# Patient Record
Sex: FEMALE | Race: WHITE | Employment: FULL TIME | URBAN - METROPOLITAN AREA
[De-identification: names, ages, dates, MRNs, and addresses within clinical notes are randomized per-mention and may not be internally consistent; named-entity substitution may affect disease eponyms.]

---

## 2019-04-03 ENCOUNTER — OFFICE VISIT (OUTPATIENT)
Dept: RHEUMATOLOGY | Age: 49
End: 2019-04-03
Payer: COMMERCIAL

## 2019-04-03 VITALS
HEART RATE: 94 BPM | WEIGHT: 145 LBS | HEIGHT: 65 IN | DIASTOLIC BLOOD PRESSURE: 73 MMHG | SYSTOLIC BLOOD PRESSURE: 113 MMHG | BODY MASS INDEX: 24.16 KG/M2

## 2019-04-03 DIAGNOSIS — L93.2 CUTANEOUS LUPUS ERYTHEMATOSUS: ICD-10-CM

## 2019-04-03 DIAGNOSIS — L93.2 CUTANEOUS LUPUS ERYTHEMATOSUS: Primary | ICD-10-CM

## 2019-04-03 LAB
A/G RATIO: 1.7 (ref 1.1–2.2)
ALBUMIN SERPL-MCNC: 5.1 G/DL (ref 3.4–5)
ALP BLD-CCNC: 61 U/L (ref 40–129)
ALT SERPL-CCNC: 17 U/L (ref 10–40)
ANION GAP SERPL CALCULATED.3IONS-SCNC: 15 MMOL/L (ref 3–16)
AST SERPL-CCNC: 23 U/L (ref 15–37)
BACTERIA: ABNORMAL /HPF
BASOPHILS ABSOLUTE: 0 K/UL (ref 0–0.2)
BASOPHILS RELATIVE PERCENT: 0.7 %
BILIRUB SERPL-MCNC: 0.6 MG/DL (ref 0–1)
BILIRUBIN URINE: NEGATIVE
BLOOD, URINE: NEGATIVE
BUN BLDV-MCNC: 16 MG/DL (ref 7–20)
C-REACTIVE PROTEIN: 1.3 MG/L (ref 0–5.1)
C3 COMPLEMENT: 132 MG/DL (ref 90–180)
C4 COMPLEMENT: 22.7 MG/DL (ref 10–40)
CALCIUM SERPL-MCNC: 9.8 MG/DL (ref 8.3–10.6)
CHLORIDE BLD-SCNC: 103 MMOL/L (ref 99–110)
CLARITY: ABNORMAL
CO2: 26 MMOL/L (ref 21–32)
COLOR: YELLOW
CREAT SERPL-MCNC: 0.7 MG/DL (ref 0.6–1.1)
CREATININE URINE: 134.6 MG/DL (ref 28–259)
EOSINOPHILS ABSOLUTE: 0 K/UL (ref 0–0.6)
EOSINOPHILS RELATIVE PERCENT: 0.6 %
EPITHELIAL CELLS, UA: 1 /HPF (ref 0–5)
GFR AFRICAN AMERICAN: >60
GFR NON-AFRICAN AMERICAN: >60
GLOBULIN: 3 G/DL
GLUCOSE BLD-MCNC: 98 MG/DL (ref 70–99)
GLUCOSE URINE: NEGATIVE MG/DL
HCT VFR BLD CALC: 45.1 % (ref 36–48)
HEMOGLOBIN: 15.1 G/DL (ref 12–16)
HYALINE CASTS: 2 /LPF (ref 0–8)
KETONES, URINE: NEGATIVE MG/DL
LEUKOCYTE ESTERASE, URINE: NEGATIVE
LYMPHOCYTES ABSOLUTE: 2.1 K/UL (ref 1–5.1)
LYMPHOCYTES RELATIVE PERCENT: 29.6 %
MCH RBC QN AUTO: 30 PG (ref 26–34)
MCHC RBC AUTO-ENTMCNC: 33.5 G/DL (ref 31–36)
MCV RBC AUTO: 89.7 FL (ref 80–100)
MICROSCOPIC EXAMINATION: YES
MONOCYTES ABSOLUTE: 0.4 K/UL (ref 0–1.3)
MONOCYTES RELATIVE PERCENT: 4.9 %
NEUTROPHILS ABSOLUTE: 4.6 K/UL (ref 1.7–7.7)
NEUTROPHILS RELATIVE PERCENT: 64.2 %
NITRITE, URINE: NEGATIVE
PDW BLD-RTO: 15.4 % (ref 12.4–15.4)
PH UA: 7 (ref 5–8)
PLATELET # BLD: 222 K/UL (ref 135–450)
PMV BLD AUTO: 8.9 FL (ref 5–10.5)
POTASSIUM SERPL-SCNC: 4.5 MMOL/L (ref 3.5–5.1)
PROTEIN PROTEIN: 11 MG/DL
PROTEIN UA: NEGATIVE MG/DL
RBC # BLD: 5.03 M/UL (ref 4–5.2)
RBC UA: 3 /HPF (ref 0–4)
SEDIMENTATION RATE, ERYTHROCYTE: 10 MM/HR (ref 0–20)
SODIUM BLD-SCNC: 144 MMOL/L (ref 136–145)
SPECIFIC GRAVITY UA: 1.03 (ref 1–1.03)
TOTAL CK: 63 U/L (ref 26–192)
TOTAL PROTEIN: 8.1 G/DL (ref 6.4–8.2)
URINE REFLEX TO CULTURE: ABNORMAL
URINE TYPE: ABNORMAL
UROBILINOGEN, URINE: 0.2 E.U./DL
WBC # BLD: 7.2 K/UL (ref 4–11)
WBC UA: 1 /HPF (ref 0–5)

## 2019-04-03 PROCEDURE — 99244 OFF/OP CNSLTJ NEW/EST MOD 40: CPT | Performed by: INTERNAL MEDICINE

## 2019-04-03 RX ORDER — SUMATRIPTAN 100 MG/1
100 TABLET, FILM COATED ORAL PRN
COMMUNITY
Start: 2019-03-27

## 2019-04-03 RX ORDER — BACITRACIN 500 UNIT/G
OINTMENT (GRAM) TOPICAL
COMMUNITY
End: 2020-04-08

## 2019-04-03 RX ORDER — HYDROXYCHLOROQUINE SULFATE 200 MG/1
200 TABLET, FILM COATED ORAL DAILY
Qty: 30 TABLET | Refills: 5 | Status: SHIPPED | OUTPATIENT
Start: 2019-04-03 | End: 2019-05-03 | Stop reason: SDUPTHER

## 2019-04-03 RX ORDER — MONTELUKAST SODIUM 10 MG/1
10 TABLET ORAL NIGHTLY
COMMUNITY
Start: 2019-03-05

## 2019-04-03 RX ORDER — ALBUTEROL SULFATE 90 UG/1
2 AEROSOL, METERED RESPIRATORY (INHALATION) EVERY 6 HOURS PRN
COMMUNITY
End: 2022-03-31

## 2019-04-03 RX ORDER — CLOBETASOL PROPIONATE 0.46 MG/ML
SOLUTION TOPICAL PRN
COMMUNITY
Start: 2019-04-02

## 2019-04-03 RX ORDER — PNV NO.95/FERROUS FUM/FOLIC AC 28MG-0.8MG
TABLET ORAL DAILY
COMMUNITY

## 2019-04-03 RX ORDER — NORETHINDRONE ACETATE AND ETHINYL ESTRADIOL 1; 5 MG/1; UG/1
1 TABLET ORAL DAILY
COMMUNITY
Start: 2019-01-14

## 2019-04-03 RX ORDER — CETIRIZINE HYDROCHLORIDE 10 MG/1
10 TABLET ORAL DAILY
COMMUNITY

## 2019-04-03 NOTE — LETTER
[address] [place] [id number]  Phone: [phone]  Fax: [phone]    Everton Gamboa MD        April 3, 2019     No referring provider defined for this encounter. MD Deborah Dash[address] [address]    Patient: Lázaro Ryan  MR Number: [id number]  YOB: 1970  Date of Visit: 4/3/2019    Dear Dr. Zach Ramirez:    Thank you for your referral. Progress note attached in visit summary. If you have questions, please do not hesitate to call me. I look forward to following Emilie along with you.     Sincerely,        Everton Gamboa MD

## 2019-04-03 NOTE — PATIENT INSTRUCTIONS
-     Miscellaneous Sendout 1; Future  -     Anti- Smith; Future  -     Anti SSA; Future  -     Anti SSB; Future  -     Anti-DNA Antibody, Double-Stranded; Future  -     Beta-2 Glycoprotein Antibodies; Future  -     C3 Complement; Future  -     C4 Complement; Future  -     Cardiolipin Antibodies IgG & IgM; Future  -     Cardiolipin Antibody, IgA; Future  -     CBC Auto Differential; Future  -     Comprehensive Metabolic Panel; Future  -     CK; Future  -     C-Reactive Protein; Future  -     Creatinine, Random Urine; Future  -     Lupus Anticoagulant; Future  -     Protein, urine, random; Future  -     RIBONUCLEIC PROTEIN ANTIBODY; Future  -     Sedimentation Rate; Future  -     Urinalysis Reflex to Culture; Future  -     hydroxychloroquine (PLAQUENIL) 200 MG tablet; Take 1 tablet by mouth daily  -     Anti-Chromatin Antibodies;  Future    -sun-protection with spf 55 or above UVA/UVB

## 2019-04-03 NOTE — PROGRESS NOTES
4/3/2019  Patient Name: Yassine Doyle  : 1970  Medical Record: [id number]    MEDICATIONS  Current Outpatient Medications   Medication Sig Dispense Refill    Ferrous Sulfate (IRON) 325 (65 Fe) MG TABS Take by mouth daily      cetirizine (ZYRTEC) 10 MG tablet Take 10 mg by mouth daily      Feverfew 100 MG CAPS Take by mouth      albuterol sulfate HFA (PROAIR HFA) 108 (90 Base) MCG/ACT inhaler Inhale 2 puffs into the lungs every 6 hours as needed for Wheezing      MAGNESIUM PO Take 30 mg by mouth daily      hydroxychloroquine (PLAQUENIL) 200 MG tablet Take 1 tablet by mouth daily 30 tablet 5    montelukast (SINGULAIR) 10 MG tablet       norethindrone-ethinyl estradiol (JINTELI) 1-5 MG-MCG TABS per tablet       SUMAtriptan (IMITREX) 100 MG tablet       clobetasol (TEMOVATE) 0.05 % external solution        No current facility-administered medications for this visit. ALLERGIES  No Known Allergies      Comments  No specialty comments available. REFERRING PHYSICIAN: Christian Salcedo MD     HISTORY OF PRESENT ILLNESS  Emilie Estrella is a 54 y.o. 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 She presented with a rash on the scalp with scarring alopecia about 6 months ago. 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 She underwent skin biopsy which showed interface dermatitis with basal vacuolization and superficial perivascular lymphocytic infiltrate and perifollicular plugging consistent with cutaneous lupus/discoid lupus. She also has erythematous rash on her chest and neck. She was referred to rule out systemic lupus. She gets intermittent low-grade fevers for past several years. She also has migraine headaches, malar rash, photosensitivity, fatigue. She denies joint pain or swelling or stiffness.   She denies oral or nasal ulcers, dry eyes and dry mouth, history of pleurisy or pericarditis, blood clots, miscarriages, pregnancy complications, sclerodactyly, and raynaud's, dysphagia, heartburn. She denies any muscle weakness. She denies any family history of lupus or systemic rheumatic disease. Workup by dermatology showed DARA direct negative. She was given intralesional Kenalog and topical Kenalog. HPI  Review of Systems    REVIEW OF SYSTEMS:   Constitutional: No unanticipated weight loss  Integumentary: No  livedo reticularis,  Raynaud's symptoms, sclerodactyly, skin tightening  Eyes: negative for visual disturbance and persistent redness, discharge from eyes   ENT: - No tinnitus, loss of hearing, vertigo, or recurrent ear infections.  - No history of nasal/oral ulcers. - No history of dry eyes/dry mouth  Cardiovascular: No history of pericarditis, chest pain or murmur or palpitations  Respiratory: No shortness of breath, cough or history of interstitial lung disease. No history of pleurisy. No history of tuberculosis or atypical infections. Gastrointestinal: No history of heart burn, dysphagia or esophageal dysmotility. No change in bowel habits or any inflammatory bowel disease. Genitourinary: No history renal disease, miscarriages. Hematologic/Lymphatic: No abnormal bruising or bleeding, blood clots or swollen lymph nodes. Neurological: No history of seizure or focal weakness. No history of neuropathies, paresthesias or hyperesthesias, facial droop, diplopia  Psychiatric: No history of bipolar disease, anxiety, depression  Endocrine: Denies any polyuria, polydipsia and osteoporosis  Allergic/Immunologic: No nasal congestion or hives. I have reviewed patients Past medical History, Social History and Family History as mentioned in her chart and this remains unchanged fromprevious. Past Medical History:   Diagnosis Date    Anemia     Asthma     Headache     Scarlet fever      History reviewed. No pertinent surgical history.   Social History     Socioeconomic History    Marital status: Single     Spouse name: Not on no active raynaud's, no ulcers. MCPs: No synovitis, no deformity             PIPs:  No synovitis, no deformity             DIPs: No synovitis, no deformity             Nails: No pitting, no telangiectasias. Lower Extremities        Hip: Full ROM, no tenderness to palpation        Knee: Full ROM, no erythema/swelling/laxity/crepitus. Patellanot ballotable. Ankle: Full ROM, no swelling or erythema        MTPs: No swelling or erythema  Back- no tenderness. Eyes:  PERRL, extra ocular movements intact, conjunctiva normal   HEENT:  Atraumatic, normocephalic, external ears normal, oropharynx moist, no pharyngeal exudates. Respiratory:  No respiratory distress  GI:  Soft, nondistended, normal bowel sounds, nontender, noorganomegaly, no mass, no rebound, no guarding   :  No costovertebral angle tenderness   Integument:  Well hydrated, erythematous rash on the chest, neck-erythematous papules on the scalp+  Lymphatic:  No lymphadenopathy noted   Neurologic:   Alert & oriented x 3, CN 2-12 normal, no focal deficits noted. Sensations Intact. Muscle strength 5/5 proximallyand distally in upper and lower extremities.    Psychiatric:  Speech and behavior appropriate           LABS AND IMAGING  Outside data reviewed and in HPI    Lab Results   Component Value Date    WBC 6.4 11/17/2014    RBC 5.09 11/17/2014    HGB 14.3 11/17/2014    HCT 41.6 11/17/2014     11/17/2014    MCV 81.7 11/17/2014    MCH 28.1 11/17/2014    MCHC 34.4 11/17/2014    RDW 15.4 11/17/2014    LYMPHOPCT 43.3 11/17/2014    MONOPCT 6.4 11/17/2014    BASOPCT 0.8 11/17/2014    MONOSABS 0.4 11/17/2014    LYMPHSABS 2.8 11/17/2014    EOSABS 0.1 11/17/2014    BASOSABS 0.1 11/17/2014       Chemistry        Component Value Date/Time     11/17/2014 2001    K 3.6 11/17/2014 2001     11/17/2014 2001    CO2 23 11/17/2014 2001    BUN 10 11/17/2014 2001    CREATININE 0.7 11/17/2014 2001        Component Value Date/Time    CALCIUM 9.3 Future  -     Beta-2 Glycoprotein Antibodies; Future  -     C3 Complement; Future  -     C4 Complement; Future  -     Cardiolipin Antibodies IgG & IgM; Future  -     Cardiolipin Antibody, IgA; Future  -     CBC Auto Differential; Future  -     Comprehensive Metabolic Panel; Future  -     CK; Future  -     C-Reactive Protein; Future  -     Creatinine, Random Urine; Future  -     Lupus Anticoagulant; Future  -     Protein, urine, random; Future  -     RIBONUCLEIC PROTEIN ANTIBODY; Future  -     Sedimentation Rate; Future  -     Urinalysis Reflex to Culture; Future  -     hydroxychloroquine (PLAQUENIL) 200 MG tablet; Take 1 tablet by mouth daily  -     Anti-Chromatin Antibodies; Future     Plaquenil can cause skin rash, GI issues, visual blurriness and increases the risk of retinal toxicity; yearly eye/retinal exam was advised while taking plaquenil. The patient indicates understanding of these issues and agrees with the plan. Return in about 3 weeks (around 4/24/2019). The risks and benefits of my recommendations, as well as other treatment options, benefits and side effects werediscussed with the patient. All questions were answered. I reviewed patient's history, referral documents and electronic medical records  Copy of consult note is being routedelectronically/faxed to referring physician         ######################################################################    I thank you for giving me theopportunity to participate in [address] [place] [address]. If you have any questions or concerns please feel free to contact me. I look forward to following  Emilie along with you. Electronically signed by: Sonny Ordonez MD, 4/3/2019 4:36 PM    Documentation was done using voice recognition dragon software. Every effort was made to ensure accuracy;however, inadvertent unintentional computerized transcription errors may be present.

## 2019-04-04 LAB
ANTI-CHROMATIN IGG: <0.2 AI (ref 0–0.9)
ANTI-DSDNA IGG: 2 IU/ML (ref 0–9)
ANTI-RNP IGG: <0.2 AI (ref 0–0.9)
ANTI-SMITH IGG: <0.2 AI (ref 0–0.9)
ANTI-SS-A IGG: <0.2 AI (ref 0–0.9)
ANTI-SS-B IGG: <0.2 AI (ref 0–0.9)

## 2019-04-05 LAB
ANTICARDIOLIPIN IGA ANTIBODY: 0 APL (ref 0–11)
ANTICARDIOLIPIN IGG ANTIBODY: 3 GPL (ref 0–14)
BETA-2 GLYCOPROTEIN 1 IGG ANTIBODY: 4 SGU (ref 0–20)
BETA-2 GLYCOPROTEIN 1 IGM ANTIBODY: 1 SMU (ref 0–20)
CARDIOLIPIN AB IGM: 1 MPL (ref 0–12)

## 2019-04-06 LAB — MISCELLANEOUS LAB TEST ORDER: ABNORMAL

## 2019-04-07 LAB
DRVVT CONFIRMATION TEST: ABNORMAL RATIO
DRVVT SCREEN: 31 SEC (ref 33–44)
DRVVT,DIL: ABNORMAL SEC (ref 33–44)
HEXAGONAL PHOSPHOLIPID NEUTRALIZAT TEST: ABNORMAL
LUPUS ANTICOAG INTERP: ABNORMAL
PLT NEUTA: ABNORMAL
PT D: 12 SEC (ref 12–15.5)
PTT D: 36 SEC (ref 32–48)
PTT-D CORR REFLEX: ABNORMAL SEC (ref 32–48)
PTT-HEPARIN NEUTRALIZED: ABNORMAL SEC (ref 32–48)
REPTILASE TIME: ABNORMAL SEC
THROMBIN TIME: ABNORMAL SEC (ref 14.7–19.5)

## 2019-04-08 LAB
Lab: NORMAL
REPORT: NORMAL
THIS TEST SENT TO: NORMAL

## 2019-04-25 ENCOUNTER — OFFICE VISIT (OUTPATIENT)
Dept: RHEUMATOLOGY | Age: 49
End: 2019-04-25
Payer: COMMERCIAL

## 2019-04-25 VITALS
HEART RATE: 74 BPM | SYSTOLIC BLOOD PRESSURE: 110 MMHG | BODY MASS INDEX: 23.82 KG/M2 | WEIGHT: 143 LBS | HEIGHT: 65 IN | DIASTOLIC BLOOD PRESSURE: 71 MMHG

## 2019-04-25 DIAGNOSIS — L93.2 CUTANEOUS LUPUS ERYTHEMATOSUS: Primary | ICD-10-CM

## 2019-04-25 DIAGNOSIS — Z79.899 LONG-TERM USE OF PLAQUENIL: ICD-10-CM

## 2019-04-25 PROCEDURE — 99213 OFFICE O/P EST LOW 20 MIN: CPT | Performed by: INTERNAL MEDICINE

## 2019-04-25 NOTE — PROGRESS NOTES
2019  Patient Name: Vilma Murray  : 1970  Medical Record: [id number]    MEDICATIONS  Current Outpatient Medications   Medication Sig Dispense Refill    hydrocortisone 2.5 % ointment       montelukast (SINGULAIR) 10 MG tablet       norethindrone-ethinyl estradiol (JINTELI) 1-5 MG-MCG TABS per tablet       SUMAtriptan (IMITREX) 100 MG tablet       clobetasol (TEMOVATE) 0.05 % external solution       Ferrous Sulfate (IRON) 325 (65 Fe) MG TABS Take by mouth daily      cetirizine (ZYRTEC) 10 MG tablet Take 10 mg by mouth daily      Feverfew 100 MG CAPS Take by mouth      albuterol sulfate HFA (PROAIR HFA) 108 (90 Base) MCG/ACT inhaler Inhale 2 puffs into the lungs every 6 hours as needed for Wheezing      MAGNESIUM PO Take 30 mg by mouth daily      hydroxychloroquine (PLAQUENIL) 200 MG tablet Take 1 tablet by mouth daily 30 tablet 5     No current facility-administered medications for this visit. ALLERGIES  No Known Allergies      Comments  No specialty comments available. Background history:  Vilma Murray is a 54 y.o. 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 She presented with a rash on the scalp with scarring alopecia about 6 months ago. 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 She underwent skin biopsy which showed interface dermatitis with basal vacuolization and superficial perivascular lymphocytic infiltrate and perifollicular plugging consistent with cutaneous lupus/discoid lupus. She also has erythematous rash on her chest and neck. She was referred to rule out systemic lupus. She gets intermittent low-grade fevers for past several years. She also has migraine headaches, malar rash, photosensitivity, fatigue. She denies joint pain or swelling or stiffness.   She denies oral or nasal ulcers, dry eyes and dry mouth, history of pleurisy or pericarditis, blood clots, miscarriages, pregnancy complications, sclerodactyly, and raynaud's, dysphagia, heartburn. She denies any muscle weakness. She denies any family history of lupus or systemic rheumatic disease. Workup by dermatology showed DARA direct negative. She was given intralesional Kenalog and topical Kenalog. Interim history: She presents for follow-up cutaneous lupus. She follows a dermatologist and is using topical steroids and intralesional Kenalog injections. She recently had worsening of skin lesions on the scalp, she went to see dermatologist and received intralesional Kenalog injections. She is also on Plaquenil 200 mg daily. She gets intermittent fevers, malar rash, photosensitivity, fatigue and migraine headaches. She has no other signs and symptoms of lupus or systemic rheumatic disease. HPI  Her blood work showed low positive DARA 1:80 speckled pattern. Complements, sub-serologies, APL panel negative. She has no proteinuria  Review of Systems    REVIEW OF SYSTEMS:   Constitutional: No unanticipated weight loss  Integumentary: No  livedo reticularis,  Raynaud's symptoms, sclerodactyly, skin tightening  Eyes: negative for visual disturbance and persistent redness, discharge from eyes   ENT: - No tinnitus, loss of hearing, vertigo, or recurrent ear infections.  - No history of nasal/oral ulcers. - No history of dry eyes/dry mouth  Cardiovascular: No history of pericarditis, chest pain or murmur or palpitations  Respiratory: No shortness of breath, cough or history of interstitial lung disease. No history of pleurisy. No history of tuberculosis or atypical infections. Gastrointestinal: No history of heart burn, dysphagia or esophageal dysmotility. No change in bowel habits or any inflammatory bowel disease. Genitourinary: No history renal disease, miscarriages. Hematologic/Lymphatic: No abnormal bruising or bleeding, blood clots or swollen lymph nodes. Neurological: No history of seizure or focal weakness.  No history of neuropathies, paresthesias or hyperesthesias, facial droop, diplopia  Psychiatric: No history of bipolar disease, anxiety, depression  Endocrine: Denies any polyuria, polydipsia and osteoporosis  Allergic/Immunologic: No nasal congestion or hives. I have reviewed patients Past medical History, Social History and Family History as mentioned in her chart and this remains unchanged fromprevious. Past Medical History:   Diagnosis Date    Anemia     Asthma     Headache     Scarlet fever      No past surgical history on file.   Social History     Socioeconomic History    Marital status: Single     Spouse name: Not on file    Number of children: Not on file    Years of education: Not on file    Highest education level: Not on file   Occupational History    Not on file   Social Needs    Financial resource strain: Not on file    Food insecurity:     Worry: Not on file     Inability: Not on file    Transportation needs:     Medical: Not on file     Non-medical: Not on file   Tobacco Use    Smoking status: Former Smoker     Last attempt to quit:      Years since quittin.3    Smokeless tobacco: Never Used   Substance and Sexual Activity    Alcohol use: Not on file    Drug use: Not on file    Sexual activity: Not on file   Lifestyle    Physical activity:     Days per week: Not on file     Minutes per session: Not on file    Stress: Not on file   Relationships    Social connections:     Talks on phone: Not on file     Gets together: Not on file     Attends [employer] service: Not on file     Active member of club or organization: Not on file     Attends meetings of clubs or organizations: Not on file     Relationship status: Not on file    Intimate partner violence:     Fear of current or ex partner: Not on file     Emotionally abused: Not on file     Physically abused: Not on file     Forced sexual activity: Not on file   Other Topics Concern    Not on file   Social History Narrative    Not on file     No family history on file. PHYSICAL EXAM   Vitals:    04/25/19 1411   BP: 110/71   Site: Right Upper Arm   Pulse: 74   Weight: 143 lb (64.9 kg)   Height: 5' 5\" (1.651 m)     Physical Exam  Constitutional:  Well developed, well nourished, no acute distress, non-toxic appearance   Musculoskeletal:    Ambulates without assistance, normal gait  Neck: Full ROM, no tenderness,supple   Upper Extremities        Shoulder: Full ROM, no crepitus        Elbow:  Full ROM, no synovitis, no deformity        Wrist: Full ROM, no synovitis, no deformity, no ulnar deviation        Fingers: No sclerodactly, no active raynaud's, no ulcers. MCPs: No synovitis, no deformity             PIPs:  No synovitis, no deformity             DIPs: No synovitis, no deformity             Nails: No pitting, no telangiectasias. Lower Extremities        Hip: Full ROM, no tenderness to palpation        Knee: Full ROM, no erythema/swelling/laxity/crepitus. Patellanot ballotable. Ankle: Full ROM, no swelling or erythema        MTPs: No swelling or erythema  Back- no tenderness. Eyes:  PERRL, extra ocular movements intact, conjunctiva normal   HEENT:  Atraumatic, normocephalic, external ears normal, oropharynx moist, no pharyngeal exudates. Respiratory:  No respiratory distress  GI:  Soft, nondistended, normal bowel sounds, nontender, noorganomegaly, no mass, no rebound, no guarding   :  No costovertebral angle tenderness   Integument:  Well hydrated, erythematous rash on the chest, neck-erythematous papules on the scalp+  Lymphatic:  No lymphadenopathy noted   Neurologic:   Alert & oriented x 3, CN 2-12 normal, no focal deficits noted. Sensations Intact. Muscle strength 5/5 proximallyand distally in upper and lower extremities.    Psychiatric:  Speech and behavior appropriate           LABS AND IMAGING  Outside data reviewed and in HPI    Lab Results   Component Value Date    WBC 7.2 04/03/2019    RBC 5.03 04/03/2019    HGB 15.1 04/03/2019    HCT 45.1 04/03/2019     04/03/2019    MCV 89.7 04/03/2019    MCH 30.0 04/03/2019    MCHC 33.5 04/03/2019    RDW 15.4 04/03/2019    LYMPHOPCT 29.6 04/03/2019    MONOPCT 4.9 04/03/2019    BASOPCT 0.7 04/03/2019    MONOSABS 0.4 04/03/2019    LYMPHSABS 2.1 04/03/2019    EOSABS 0.0 04/03/2019    BASOSABS 0.0 04/03/2019       Chemistry        Component Value Date/Time     04/03/2019 1459    K 4.5 04/03/2019 1459     04/03/2019 1459    CO2 26 04/03/2019 1459    BUN 16 04/03/2019 1459    CREATININE 0.7 04/03/2019 1459        Component Value Date/Time    CALCIUM 9.8 04/03/2019 1459    ALKPHOS 61 04/03/2019 1459    AST 23 04/03/2019 1459    ALT 17 04/03/2019 1459    BILITOT 0.6 04/03/2019 1459          Lab Results   Component Value Date    SEDRATE 10 04/03/2019     Lab Results   Component Value Date    CRP 1.3 04/03/2019     Lab Results   Component Value Date    C3 132.0 04/03/2019    C4 22.7 04/03/2019     No results found for: RF, CCPABIGG  No results found for: DARA, ANATITER, ANAINT, PATH  No results found for: DSDNAG, DSDNAIGGIFA  No results found for: SSAROAB, SSALAAB  No results found for: SMAB, RNPAB  No results found for: CENTABIGG  Lab Results   Component Value Date    C3 132.0 04/03/2019    C4 22.7 04/03/2019     No results found for: JO1, VITD25, [id number]  No results found for: [place]  No results found for: MMSDJLSN15  No results found for: TSHFT4, TSH  No results found for: VITD25    Skin Biopsy 2/11/19  Skin biopsy with interface changes with moderate basal vacuolization and a moderate superficial perivascular lymphocytic infiltrate with numerous lymphocytes found at the dermal epidermal junction. There is a moderate perifollicular infiltrate found at the level of infundibulum/isthmus, there is a mild superficial dermal fibrosis  ASSESSMENT AND PLAN      Assessment/Plan:      ASSESSMENT:    1. Cutaneous lupus erythematosus        PLAN:   1. Cutaneous lupus erythematosus  skin biopsy 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 History of low-grade fevers, photosensitivity, fatigue, intermittent malar rash. -No other signs and symptoms of systemic lupus  -Low titer positive DARA 1:80 speckled pattern  dsDNA, SSA/SSB, anti-Smith, RNP, APL panel, C3-C4 negative   No proteinuria  -Advised to continue follow-up with dermatology, she receives intralesional Kenalog injections and topical steroids  -Continue Plaquenil 200 mg daily  -Advised to call us back if she develops any signs and symptoms of systemic lupus  -Sun protection with UVA/UVB SPF 55 or above    The patient indicates understanding of these issues and agrees with the plan. Return in about 6 months (around 10/25/2019). The risks and benefits of my recommendations, as well as other treatment options, benefits and side effects werediscussed with the patient. All questions were answered. ######################################################################    I thank you for giving me theopportunity to participate in [address] [place] [address]. If you have any questions or concerns please feel free to contact me. I look forward to following  Emilie along with you. Electronically signed by: Mehran Johnson MD, 4/25/2019 2:38 PM    Documentation was done using voice recognition dragon software. Every effort was made to ensure accuracy;however, inadvertent unintentional computerized transcription errors may be present.

## 2019-05-03 DIAGNOSIS — L93.2 CUTANEOUS LUPUS ERYTHEMATOSUS: ICD-10-CM

## 2019-05-03 RX ORDER — HYDROXYCHLOROQUINE SULFATE 200 MG/1
200 TABLET, FILM COATED ORAL DAILY
Qty: 30 TABLET | Refills: 5 | Status: SHIPPED | OUTPATIENT
Start: 2019-05-03 | End: 2019-10-09 | Stop reason: SDUPTHER

## 2019-10-09 ENCOUNTER — OFFICE VISIT (OUTPATIENT)
Dept: RHEUMATOLOGY | Age: 49
End: 2019-10-09
Payer: COMMERCIAL

## 2019-10-09 VITALS
HEIGHT: 65 IN | DIASTOLIC BLOOD PRESSURE: 71 MMHG | BODY MASS INDEX: 23.49 KG/M2 | SYSTOLIC BLOOD PRESSURE: 121 MMHG | WEIGHT: 141 LBS

## 2019-10-09 DIAGNOSIS — Z79.899 LONG-TERM USE OF PLAQUENIL: ICD-10-CM

## 2019-10-09 DIAGNOSIS — L93.2 CUTANEOUS LUPUS ERYTHEMATOSUS: Primary | ICD-10-CM

## 2019-10-09 DIAGNOSIS — L93.2 CUTANEOUS LUPUS ERYTHEMATOSUS: ICD-10-CM

## 2019-10-09 LAB
A/G RATIO: 2.5 (ref 1.1–2.2)
ALBUMIN SERPL-MCNC: 4.9 G/DL (ref 3.4–5)
ALP BLD-CCNC: 55 U/L (ref 40–129)
ALT SERPL-CCNC: 16 U/L (ref 10–40)
ANION GAP SERPL CALCULATED.3IONS-SCNC: 15 MMOL/L (ref 3–16)
AST SERPL-CCNC: 22 U/L (ref 15–37)
BASOPHILS ABSOLUTE: 0 K/UL (ref 0–0.2)
BASOPHILS RELATIVE PERCENT: 0.7 %
BILIRUB SERPL-MCNC: 0.7 MG/DL (ref 0–1)
BILIRUBIN URINE: NEGATIVE
BLOOD, URINE: NEGATIVE
BUN BLDV-MCNC: 13 MG/DL (ref 7–20)
C-REACTIVE PROTEIN: 1.4 MG/L (ref 0–5.1)
C3 COMPLEMENT: 114.9 MG/DL (ref 90–180)
C4 COMPLEMENT: 21.7 MG/DL (ref 10–40)
CALCIUM SERPL-MCNC: 9.3 MG/DL (ref 8.3–10.6)
CHLORIDE BLD-SCNC: 102 MMOL/L (ref 99–110)
CLARITY: CLEAR
CO2: 25 MMOL/L (ref 21–32)
COLOR: YELLOW
CREAT SERPL-MCNC: 0.8 MG/DL (ref 0.6–1.1)
CREATININE URINE: 21.7 MG/DL (ref 28–259)
EOSINOPHILS ABSOLUTE: 0.1 K/UL (ref 0–0.6)
EOSINOPHILS RELATIVE PERCENT: 1.1 %
GFR AFRICAN AMERICAN: >60
GFR NON-AFRICAN AMERICAN: >60
GLOBULIN: 2 G/DL
GLUCOSE BLD-MCNC: 113 MG/DL (ref 70–99)
GLUCOSE URINE: NEGATIVE MG/DL
HCT VFR BLD CALC: 44.6 % (ref 36–48)
HEMOGLOBIN: 14.9 G/DL (ref 12–16)
KETONES, URINE: NEGATIVE MG/DL
LEUKOCYTE ESTERASE, URINE: NEGATIVE
LYMPHOCYTES ABSOLUTE: 2 K/UL (ref 1–5.1)
LYMPHOCYTES RELATIVE PERCENT: 35.2 %
MCH RBC QN AUTO: 30.6 PG (ref 26–34)
MCHC RBC AUTO-ENTMCNC: 33.4 G/DL (ref 31–36)
MCV RBC AUTO: 91.5 FL (ref 80–100)
MICROSCOPIC EXAMINATION: NORMAL
MONOCYTES ABSOLUTE: 0.3 K/UL (ref 0–1.3)
MONOCYTES RELATIVE PERCENT: 6 %
NEUTROPHILS ABSOLUTE: 3.2 K/UL (ref 1.7–7.7)
NEUTROPHILS RELATIVE PERCENT: 57 %
NITRITE, URINE: NEGATIVE
PDW BLD-RTO: 13.4 % (ref 12.4–15.4)
PH UA: 7.5 (ref 5–8)
PLATELET # BLD: 194 K/UL (ref 135–450)
PMV BLD AUTO: 9.7 FL (ref 5–10.5)
POTASSIUM SERPL-SCNC: 4.2 MMOL/L (ref 3.5–5.1)
PROTEIN PROTEIN: <4 MG/DL
PROTEIN UA: NEGATIVE MG/DL
RBC # BLD: 4.88 M/UL (ref 4–5.2)
SODIUM BLD-SCNC: 142 MMOL/L (ref 136–145)
SPECIFIC GRAVITY UA: 1.01 (ref 1–1.03)
TOTAL PROTEIN: 6.9 G/DL (ref 6.4–8.2)
URINE REFLEX TO CULTURE: NORMAL
URINE TYPE: NORMAL
UROBILINOGEN, URINE: 0.2 E.U./DL
WBC # BLD: 5.6 K/UL (ref 4–11)

## 2019-10-09 PROCEDURE — 99214 OFFICE O/P EST MOD 30 MIN: CPT | Performed by: INTERNAL MEDICINE

## 2019-10-09 RX ORDER — FINASTERIDE 5 MG/1
5 TABLET, FILM COATED ORAL DAILY
COMMUNITY
Start: 2019-09-11

## 2019-10-09 RX ORDER — HYDROXYCHLOROQUINE SULFATE 200 MG/1
300 TABLET, FILM COATED ORAL DAILY
Qty: 45 TABLET | Refills: 5 | Status: SHIPPED | OUTPATIENT
Start: 2019-10-09 | End: 2019-12-03 | Stop reason: SDUPTHER

## 2019-10-10 LAB
ANTI-DSDNA IGG: 2 IU/ML (ref 0–9)
SEDIMENTATION RATE, ERYTHROCYTE: 6 MM/HR (ref 0–20)

## 2019-12-14 DIAGNOSIS — L93.2 CUTANEOUS LUPUS ERYTHEMATOSUS: ICD-10-CM

## 2019-12-17 RX ORDER — HYDROXYCHLOROQUINE SULFATE 200 MG/1
TABLET, FILM COATED ORAL
Qty: 135 TABLET | Refills: 0 | Status: SHIPPED | OUTPATIENT
Start: 2019-12-17 | End: 2020-03-19

## 2020-03-19 RX ORDER — HYDROXYCHLOROQUINE SULFATE 200 MG/1
TABLET, FILM COATED ORAL
Qty: 90 TABLET | Refills: 0 | Status: SHIPPED | OUTPATIENT
Start: 2020-03-19 | End: 2020-03-23 | Stop reason: SDUPTHER

## 2020-03-23 RX ORDER — HYDROXYCHLOROQUINE SULFATE 200 MG/1
TABLET, FILM COATED ORAL
Qty: 135 TABLET | Refills: 1 | Status: SHIPPED | OUTPATIENT
Start: 2020-03-23 | End: 2020-03-23 | Stop reason: SDUPTHER

## 2020-03-23 RX ORDER — HYDROXYCHLOROQUINE SULFATE 200 MG/1
TABLET, FILM COATED ORAL
Qty: 135 TABLET | Refills: 1 | Status: SHIPPED | OUTPATIENT
Start: 2020-03-23 | End: 2020-12-28 | Stop reason: SDUPTHER

## 2020-04-08 ENCOUNTER — VIRTUAL VISIT (OUTPATIENT)
Dept: RHEUMATOLOGY | Age: 50
End: 2020-04-08
Payer: COMMERCIAL

## 2020-04-08 PROCEDURE — 99214 OFFICE O/P EST MOD 30 MIN: CPT | Performed by: INTERNAL MEDICINE

## 2020-04-08 RX ORDER — PAROXETINE 10 MG/1
10 TABLET, FILM COATED ORAL EVERY MORNING
COMMUNITY

## 2020-04-08 NOTE — PROGRESS NOTES
daily      albuterol sulfate HFA (PROAIR HFA) 108 (90 Base) MCG/ACT inhaler Inhale 2 puffs into the lungs every 6 hours as needed for Wheezing      MAGNESIUM PO Take 30 mg by mouth daily       No current facility-administered medications for this visit. ALLERGIES  No Known Allergies      Comments  No specialty comments available. Background history:  Danilo Clark is a 52 y.o. 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 She presented with a rash on the scalp with scarring alopecia about 6 months ago. 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 She underwent skin biopsy which showed interface dermatitis with basal vacuolization and superficial perivascular lymphocytic infiltrate and perifollicular plugging consistent with cutaneous lupus/discoid lupus. She also has erythematous rash on her chest and neck. She was referred to rule out systemic lupus. She gets intermittent low-grade fevers for past several years. She also has migraine headaches, malar rash, photosensitivity, fatigue. She denies joint pain or swelling or stiffness. She denies oral or nasal ulcers, dry eyes and dry mouth, history of pleurisy or pericarditis, blood clots, miscarriages, pregnancy complications, sclerodactyly, and raynaud's, dysphagia, heartburn. She denies any muscle weakness. She denies any family history of lupus or systemic rheumatic disease. Workup by dermatology showed DARA direct negative. She was given intralesional Kenalog and topical Kenalog. Interim history: She presents for follow-up cutaneous lupus. She follows a dermatologist and is using topical steroids. She is on Plaquenil 300 mg daily. Her symptoms have improved since starting Plaquenil. She occasionally gets flareups on the scalp for which he uses topical steroid. Hair growth is improved. She denies any episodes of malar rash.   She is having less episodes of low-grade fever.   She has no other signs and symptoms of lupus or systemic rheumatic disease. HPI  Her blood work showed low positive DARA 1:80 speckled pattern. Complements, sub-serologies, APL panel negative. She has no proteinuria. She denies any side effects with Plaquenil. Recent eye exam normal.    HPI  Review of Systems    REVIEW OF SYSTEMS:   Constitutional: No unanticipated weight loss  Integumentary: No  livedo reticularis,  Raynaud's symptoms, sclerodactyly, skin tightening  Eyes: negative for visual disturbance and persistent redness, discharge from eyes   ENT: - No tinnitus, loss of hearing, vertigo, or recurrent ear infections.  - No history of nasal/oral ulcers. - No history of dry eyes/dry mouth  Cardiovascular: No history of pericarditis, chest pain or murmur or palpitations  Respiratory: No shortness of breath, cough or history of interstitial lung disease. No history of pleurisy. No history of tuberculosis or atypical infections. Gastrointestinal: No history of heart burn, dysphagia or esophageal dysmotility. No change in bowel habits or any inflammatory bowel disease. Genitourinary: No history renal disease, miscarriages. Hematologic/Lymphatic: No abnormal bruising or bleeding, blood clots or swollen lymph nodes. Neurological: No history of seizure or focal weakness. No history of neuropathies, paresthesias or hyperesthesias, facial droop, diplopia  Psychiatric: No history of bipolar disease, depression  Endocrine: Denies any polyuria, polydipsia and osteoporosis  Allergic/Immunologic: No nasal congestion or hives. I have reviewed patients Past medical History, Social History and Family History as mentioned in her chart and this remains unchanged fromprevious. Past Medical History:   Diagnosis Date    Anemia     Asthma     Headache     Scarlet fever      History reviewed. No pertinent surgical history.   Social History     Socioeconomic History    Marital status: Single     Spouse

## 2020-06-15 ENCOUNTER — TELEPHONE (OUTPATIENT)
Dept: INTERNAL MEDICINE CLINIC | Age: 50
End: 2020-06-15

## 2020-08-26 ENCOUNTER — TELEPHONE (OUTPATIENT)
Dept: RHEUMATOLOGY | Age: 50
End: 2020-08-26

## 2020-08-26 NOTE — TELEPHONE ENCOUNTER
Patient requests letter stating she cannot be in prolonged sun exposure. Please fax to [phone], ATTN: Domo Cano.

## 2020-08-26 NOTE — TELEPHONE ENCOUNTER
Please send a letter stating that she should limit her sun exposure due to cutaneous lupus.   Whenever she is in the sun she will use SPF 55 or above with UVA/UVB

## 2020-12-28 RX ORDER — HYDROXYCHLOROQUINE SULFATE 200 MG/1
TABLET, FILM COATED ORAL
Qty: 45 TABLET | Refills: 0 | Status: SHIPPED | OUTPATIENT
Start: 2020-12-28 | End: 2021-01-15 | Stop reason: SDUPTHER

## 2021-01-14 NOTE — PROGRESS NOTES
1/15/2021   Emilie Bauer is a 52 y.o. female being evaluated by a Virtual Visit (video visit) encounter to address concerns as mentioned above. A caregiver was present when appropriate. Due to this being a TeleHealth encounter (During DIDQP-10 public health emergency), evaluation of the following organ systems was limited: Vitals/Constitutional/EENT/Resp/CV/GI//MS/Neuro/Skin/Heme-Lymph-Imm. Pursuant to the emergency declaration under the [address] [place] [address] authority and the [employer] Act, this Virtual Visit was conducted with patient's (and/or legal guardian's) consent, to reduce the patient's risk of exposure to COVID-19 and provide necessary medical care. The patient (and/or legal guardian) has also been advised to contact this office for worsening conditions or problems, and seek emergency medical treatment and/or call 911 if deemed necessary. Services were provided through a video synchronous discussion virtually to substitute for in-person clinic visit. Patient and provider were located at their individual homes. --Najma Ramos MD on 1/15/2021 at 9:16 AM    An electronic signature was used to authenticate this note.   Patient Name: Fuad Coker  : 1970  Medical Record: [id number]    MEDICATIONS  Current Outpatient Medications   Medication Sig Dispense Refill    magnesium 30 MG tablet Take 30 mg by mouth daily      b complex vitamins capsule Take 1 capsule by mouth daily      Omega-3 Fatty Acids (OMEGA 3 PO) Take by mouth daily      hydroxychloroquine (PLAQUENIL) 200 MG tablet TAKE 1.5 TABLET DAILY 45 tablet 3    PARoxetine (PAXIL) 10 MG tablet Take 10 mg by mouth every morning      finasteride (PROSCAR) 5 MG tablet Take 5 mg by mouth daily       montelukast (SINGULAIR) 10 MG tablet Take 10 mg by mouth nightly       norethindrone-ethinyl estradiol (JINTELI) 1-5 MG-MCG TABS per tablet 1 tablet daily       SUMAtriptan (IMITREX) 100 MG tablet Take 100 mg by mouth as needed       clobetasol (TEMOVATE) 0.05 % external solution Apply topically as needed       Ferrous Sulfate (IRON) 325 (65 Fe) MG TABS Take by mouth daily      cetirizine (ZYRTEC) 10 MG tablet Take 10 mg by mouth daily      albuterol sulfate HFA (PROAIR HFA) 108 (90 Base) MCG/ACT inhaler Inhale 2 puffs into the lungs every 6 hours as needed for Wheezing       No current facility-administered medications for this visit. ALLERGIES  Allergies   Allergen Reactions    Peanut Oil      Other reaction(s): Headache    Pineapple      Other reaction(s): Headache         Comments  No specialty comments available. Background history:  Ban Oliver is a 52 y.o. 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 She presented with a rash on the scalp with scarring alopecia about 6 months ago. 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 She underwent skin biopsy which showed interface dermatitis with basal vacuolization and superficial perivascular lymphocytic infiltrate and perifollicular plugging consistent with cutaneous lupus/discoid lupus. She also has erythematous rash on her chest and neck. She was referred to rule out systemic lupus. She gets intermittent low-grade fevers for past several years. She also has migraine headaches, malar rash, photosensitivity, fatigue. She denies joint pain or swelling or stiffness. She denies oral or nasal ulcers, dry eyes and dry mouth, history of pleurisy or pericarditis, blood clots, miscarriages, pregnancy complications, sclerodactyly, and raynaud's, dysphagia, heartburn. She denies any muscle weakness. She denies any family history of lupus or systemic rheumatic disease. Workup by dermatology showed DARA direct negative. She was given intralesional Kenalog and topical Kenalog.        Interim history: She presents for follow-up cutaneous lupus. She follows a dermatologist and is using topical steroids. She is on Plaquenil 300 mg daily. Her symptoms have improved since starting Plaquenil. She occasionally gets flareups on the scalp for which he uses topical steroid. Hair growth is improved. Occasional malar rash on exposure to sunlight. She is having less episodes of low-grade fever. She has no other signs and symptoms of lupus or systemic rheumatic disease. HPI  Her blood work showed low positive DARA 1:80 speckled pattern. Complements, sub-serologies, APL panel negative. She has no proteinuria. She denies any side effects with Plaquenil. Recent eye exam normal. Labs reviewed in media section. HPI  Review of Systems    REVIEW OF SYSTEMS:   Constitutional: No unanticipated weight loss  Integumentary: No  livedo reticularis,  Raynaud's symptoms, sclerodactyly, skin tightening  Eyes: negative for visual disturbance and persistent redness, discharge from eyes   ENT: - No tinnitus, loss of hearing, vertigo, or recurrent ear infections.  - No history of nasal/oral ulcers. - No history of dry eyes/dry mouth  Cardiovascular: No history of pericarditis, chest pain or murmur or palpitations  Respiratory: No shortness of breath, cough or history of interstitial lung disease. No history of pleurisy. No history of tuberculosis or atypical infections. Gastrointestinal: No history of heart burn, dysphagia or esophageal dysmotility. No change in bowel habits or any inflammatory bowel disease. Genitourinary: No history renal disease, miscarriages. Hematologic/Lymphatic: No abnormal bruising or bleeding, blood clots or swollen lymph nodes. Neurological: No history of seizure or focal weakness.  No history of neuropathies, paresthesias or hyperesthesias, facial droop, diplopia  Psychiatric: No history of bipolar disease, depression  Endocrine: Denies any polyuria, polydipsia and osteoporosis  Allergic/Immunologic: No nasal obtained by patient/caregiver or practitioner observation)    Blood pressure-  Heart rate-    Respiratory rate-    Temperature-  Pulse oximetry-     Constitutional: [x] Appears well-developed and well-nourished [x] No apparent distress      [] Abnormal-   Mental status  [x] Alert and awake  [x] Oriented to person/place/time [x]Able to follow commands      Eyes:  EOM    [x]  Normal  [] Abnormal-  Sclera  [x]  Normal  [] Abnormal -         Discharge [x]  None visible  [] Abnormal -    HENT:   [x] Normocephalic, atraumatic.   [] Abnormal   [x] Mouth/Throat: Mucous membranes are moist.     External Ears [x] Normal  [] Abnormal-     Neck: [x] No visualized mass     Pulmonary/Chest: [x] Respiratory effort normal.  [x] No visualized signs of difficulty breathing or respiratory distress        [] Abnormal-      Musculoskeletal:   [x] Normal gait with no signs of ataxia         [x] Normal range of motion of neck        [] Abnormal-       Neurological:        [x] No Facial Asymmetry (Cranial nerve 7 motor function) (limited exam to video visit)          [x] No gaze palsy        [] Abnormal-         Skin:        [x] No significant exanthematous lesions or discoloration noted on facial skin         [] Abnormal-            Psychiatric:       [x] Normal Affect [] No Hallucinations        [] Abnormal-       Integument:  Well hydrated, erythematous rash on the chest            LABS AND IMAGING  Outside data reviewed and in HPI    Lab Results   Component Value Date    WBC 5.6 10/09/2019    RBC 4.88 10/09/2019    HGB 14.9 10/09/2019    HCT 44.6 10/09/2019     10/09/2019    MCV 91.5 10/09/2019    MCH 30.6 10/09/2019    MCHC 33.4 10/09/2019    RDW 13.4 10/09/2019    LYMPHOPCT 35.2 10/09/2019    MONOPCT 6.0 10/09/2019    BASOPCT 0.7 10/09/2019    MONOSABS 0.3 10/09/2019    LYMPHSABS 2.0 10/09/2019    EOSABS 0.1 10/09/2019    BASOSABS 0.0 10/09/2019       Chemistry        Component Value Date/Time     10/09/2019 1314    K 4.2 10/09/2019 1314     10/09/2019 1314    CO2 25 10/09/2019 1314    BUN 13 10/09/2019 1314    CREATININE 0.8 10/09/2019 1314        Component Value Date/Time    CALCIUM 9.3 10/09/2019 1314    ALKPHOS 55 10/09/2019 1314    AST 22 10/09/2019 1314    ALT 16 10/09/2019 1314    BILITOT 0.7 10/09/2019 1314          Lab Results   Component Value Date    SEDRATE 6 10/09/2019     Lab Results   Component Value Date    CRP 1.4 10/09/2019     Lab Results   Component Value Date    C3 114.9 10/09/2019    C3 132.0 04/03/2019    C4 21.7 10/09/2019    C4 22.7 04/03/2019     No results found for: RF, CCPABIGG  No results found for: DARA, ANATITER, ANAINT, PATH  No results found for: DSDNAG, DSDNAIGGIFA  No results found for: SSAROAB, SSALAAB  No results found for: SMAB, RNPAB  No results found for: CENTABIGG  Lab Results   Component Value Date    C3 114.9 10/09/2019    C4 21.7 10/09/2019     No results found for: Anne Adlerschfeld, [id number]  No results found for: Elnita Aus  No results found for: [id number]  No results found for: TSHFT4, TSH  No results found for: VITD25    Skin Biopsy 2/11/19  Skin biopsy with interface changes with moderate basal vacuolization and a moderate superficial perivascular lymphocytic infiltrate with numerous lymphocytes found at the dermal epidermal junction. There is a moderate perifollicular infiltrate found at the level of infundibulum/isthmus, there is a mild superficial dermal fibrosis  ASSESSMENT AND PLAN      Assessment/Plan:      ASSESSMENT:    1. Cutaneous lupus erythematosus    2. Long-term use of Plaquenil        PLAN:     1. 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 History of low-grade fevers, photosensitivity, fatigue, intermittent malar rash.   -No other signs and symptoms of systemic lupus  -Rash improved after starting Plaquenil  -Low titer positive DARA 1:80 speckled pattern  dsDNA, SSA/SSB, anti-Smith, RNP, APL panel, C3-C4 negative   No proteinuria  -Advised to continue follow-up with dermatology,   Continue Plaquenil  300 mg daily  Sun protection with UVA/UVB SPF 55 or above  - CBC Auto Differential; Future  - Comprehensive Metabolic Panel; Future  - C-Reactive Protein; Future  - Sedimentation Rate; Future  - Anti-DNA Antibody, Double-Stranded; Future  - C3 Complement; Future  - C4 Complement; Future  - Urinalysis Reflex to Culture; Future  - Creatinine, Random Urine; Future  - Protein, urine, random; Future    2. Long-term use of Plaquenil  Up-to-date with eye exam  -Advised to call us back if she develops any signs and symptoms of systemic lupus  -  The patient indicates understanding of these issues and agrees with the plan. Return in about 6 months (around 7/15/2021). The risks and benefits of my recommendations, as well as other treatment options, benefits and side effects werediscussed with the patient. All questions were answered. ######################################################################    I thank you for giving me theopportunity to participate in [address] [place] [address]. If you have any questions or concerns please feel free to contact me. I look forward to following  Emilie along with you. Electronically signed by: Angela Slade MD, 1/15/2021 9:16 AM    Documentation was done using voice recognition dragon software. Every effort was made to ensure accuracy;however, inadvertent unintentional computerized transcription errors may be present.

## 2021-01-15 ENCOUNTER — VIRTUAL VISIT (OUTPATIENT)
Dept: RHEUMATOLOGY | Age: 51
End: 2021-01-15
Payer: COMMERCIAL

## 2021-01-15 DIAGNOSIS — L93.2 CUTANEOUS LUPUS ERYTHEMATOSUS: Primary | ICD-10-CM

## 2021-01-15 DIAGNOSIS — Z79.899 LONG-TERM USE OF PLAQUENIL: ICD-10-CM

## 2021-01-15 PROCEDURE — 99214 OFFICE O/P EST MOD 30 MIN: CPT | Performed by: INTERNAL MEDICINE

## 2021-01-15 RX ORDER — VITAMIN B COMPLEX
1 CAPSULE ORAL DAILY
COMMUNITY

## 2021-01-15 RX ORDER — HYDROXYCHLOROQUINE SULFATE 200 MG/1
TABLET, FILM COATED ORAL
Qty: 45 TABLET | Refills: 3 | Status: SHIPPED | OUTPATIENT
Start: 2021-01-15 | End: 2021-03-29 | Stop reason: SDUPTHER

## 2021-01-15 RX ORDER — MAGNESIUM 30 MG
30 TABLET ORAL DAILY
COMMUNITY

## 2021-02-02 ENCOUNTER — TELEPHONE (OUTPATIENT)
Dept: INTERNAL MEDICINE CLINIC | Age: 51
End: 2021-02-02

## 2021-02-02 NOTE — TELEPHONE ENCOUNTER
Pt. Calling and said her labs were supposed to be mailed to her on 1/15/21 and she still hasn't received them.

## 2021-03-04 DIAGNOSIS — L93.2 CUTANEOUS LUPUS ERYTHEMATOSUS: ICD-10-CM

## 2021-03-04 LAB
A/G RATIO: 1.7 (ref 1.1–2.2)
ALBUMIN SERPL-MCNC: 4.3 G/DL (ref 3.4–5)
ALP BLD-CCNC: 55 U/L (ref 40–129)
ALT SERPL-CCNC: 23 U/L (ref 10–40)
ANION GAP SERPL CALCULATED.3IONS-SCNC: 16 MMOL/L (ref 3–16)
AST SERPL-CCNC: 26 U/L (ref 15–37)
BASOPHILS ABSOLUTE: 0 K/UL (ref 0–0.2)
BASOPHILS RELATIVE PERCENT: 0.8 %
BILIRUB SERPL-MCNC: 0.9 MG/DL (ref 0–1)
BILIRUBIN URINE: NEGATIVE
BLOOD, URINE: NEGATIVE
BUN BLDV-MCNC: 11 MG/DL (ref 7–20)
C-REACTIVE PROTEIN: <3 MG/L (ref 0–5.1)
C3 COMPLEMENT: 119.8 MG/DL (ref 90–180)
C4 COMPLEMENT: 18.4 MG/DL (ref 10–40)
CALCIUM SERPL-MCNC: 10.1 MG/DL (ref 8.3–10.6)
CHLORIDE BLD-SCNC: 102 MMOL/L (ref 99–110)
CLARITY: CLEAR
CO2: 23 MMOL/L (ref 21–32)
COLOR: YELLOW
CREAT SERPL-MCNC: 0.7 MG/DL (ref 0.6–1.1)
CREATININE URINE: 33.7 MG/DL (ref 28–259)
EOSINOPHILS ABSOLUTE: 0 K/UL (ref 0–0.6)
EOSINOPHILS RELATIVE PERCENT: 0.6 %
GFR AFRICAN AMERICAN: >60
GFR NON-AFRICAN AMERICAN: >60
GLOBULIN: 2.6 G/DL
GLUCOSE BLD-MCNC: 73 MG/DL (ref 70–99)
GLUCOSE URINE: NEGATIVE MG/DL
HCT VFR BLD CALC: 43.4 % (ref 36–48)
HEMOGLOBIN: 14.6 G/DL (ref 12–16)
KETONES, URINE: NEGATIVE MG/DL
LEUKOCYTE ESTERASE, URINE: NEGATIVE
LYMPHOCYTES ABSOLUTE: 1.6 K/UL (ref 1–5.1)
LYMPHOCYTES RELATIVE PERCENT: 33.5 %
MCH RBC QN AUTO: 30.6 PG (ref 26–34)
MCHC RBC AUTO-ENTMCNC: 33.6 G/DL (ref 31–36)
MCV RBC AUTO: 91.3 FL (ref 80–100)
MICROSCOPIC EXAMINATION: NORMAL
MONOCYTES ABSOLUTE: 0.4 K/UL (ref 0–1.3)
MONOCYTES RELATIVE PERCENT: 7.7 %
NEUTROPHILS ABSOLUTE: 2.8 K/UL (ref 1.7–7.7)
NEUTROPHILS RELATIVE PERCENT: 57.4 %
NITRITE, URINE: NEGATIVE
PDW BLD-RTO: 13.2 % (ref 12.4–15.4)
PH UA: 7 (ref 5–8)
PLATELET # BLD: 175 K/UL (ref 135–450)
PMV BLD AUTO: 9.5 FL (ref 5–10.5)
POTASSIUM SERPL-SCNC: 4.7 MMOL/L (ref 3.5–5.1)
PROTEIN PROTEIN: <4 MG/DL
PROTEIN UA: NEGATIVE MG/DL
RBC # BLD: 4.76 M/UL (ref 4–5.2)
SEDIMENTATION RATE, ERYTHROCYTE: 5 MM/HR (ref 0–20)
SODIUM BLD-SCNC: 141 MMOL/L (ref 136–145)
SPECIFIC GRAVITY UA: 1.01 (ref 1–1.03)
TOTAL PROTEIN: 6.9 G/DL (ref 6.4–8.2)
URINE REFLEX TO CULTURE: NORMAL
URINE TYPE: NORMAL
UROBILINOGEN, URINE: 0.2 E.U./DL
WBC # BLD: 4.9 K/UL (ref 4–11)

## 2021-03-05 LAB — ANTI-DSDNA IGG: 2 IU/ML (ref 0–9)

## 2021-03-29 DIAGNOSIS — L93.2 CUTANEOUS LUPUS ERYTHEMATOSUS: ICD-10-CM

## 2021-03-29 RX ORDER — HYDROXYCHLOROQUINE SULFATE 200 MG/1
TABLET, FILM COATED ORAL
Qty: 45 TABLET | Refills: 3 | Status: SHIPPED | OUTPATIENT
Start: 2021-03-29 | End: 2021-07-23

## 2021-07-15 ENCOUNTER — VIRTUAL VISIT (OUTPATIENT)
Dept: RHEUMATOLOGY | Age: 51
End: 2021-07-15
Payer: COMMERCIAL

## 2021-07-15 DIAGNOSIS — Z79.899 LONG-TERM USE OF PLAQUENIL: ICD-10-CM

## 2021-07-15 DIAGNOSIS — L93.2 CUTANEOUS LUPUS ERYTHEMATOSUS: Primary | ICD-10-CM

## 2021-07-15 DIAGNOSIS — L93.2 CUTANEOUS LUPUS ERYTHEMATOSUS: ICD-10-CM

## 2021-07-15 LAB
A/G RATIO: 1.7 (ref 1.1–2.2)
ALBUMIN SERPL-MCNC: 4 G/DL (ref 3.4–5)
ALP BLD-CCNC: 54 U/L (ref 40–129)
ALT SERPL-CCNC: 22 U/L (ref 10–40)
ANION GAP SERPL CALCULATED.3IONS-SCNC: 13 MMOL/L (ref 3–16)
AST SERPL-CCNC: 25 U/L (ref 15–37)
BACTERIA: ABNORMAL /HPF
BASOPHILS ABSOLUTE: 0 K/UL (ref 0–0.2)
BASOPHILS RELATIVE PERCENT: 0.9 %
BILIRUB SERPL-MCNC: 0.7 MG/DL (ref 0–1)
BILIRUBIN URINE: NEGATIVE
BLOOD, URINE: NEGATIVE
BUN BLDV-MCNC: 14 MG/DL (ref 7–20)
C-REACTIVE PROTEIN: <3 MG/L (ref 0–5.1)
C3 COMPLEMENT: 104.3 MG/DL (ref 90–180)
C4 COMPLEMENT: 18.1 MG/DL (ref 10–40)
CALCIUM SERPL-MCNC: 8.9 MG/DL (ref 8.3–10.6)
CHLORIDE BLD-SCNC: 104 MMOL/L (ref 99–110)
CLARITY: CLEAR
CO2: 23 MMOL/L (ref 21–32)
COLOR: YELLOW
CREAT SERPL-MCNC: 0.8 MG/DL (ref 0.6–1.1)
CREATININE URINE: 93.5 MG/DL (ref 28–259)
EOSINOPHILS ABSOLUTE: 0 K/UL (ref 0–0.6)
EOSINOPHILS RELATIVE PERCENT: 0.8 %
EPITHELIAL CELLS, UA: 1 /HPF (ref 0–5)
GFR AFRICAN AMERICAN: >60
GFR NON-AFRICAN AMERICAN: >60
GLOBULIN: 2.4 G/DL
GLUCOSE BLD-MCNC: 97 MG/DL (ref 70–99)
GLUCOSE URINE: NEGATIVE MG/DL
HCT VFR BLD CALC: 41 % (ref 36–48)
HEMOGLOBIN: 14.1 G/DL (ref 12–16)
HYALINE CASTS: 1 /LPF (ref 0–8)
KETONES, URINE: NEGATIVE MG/DL
LEUKOCYTE ESTERASE, URINE: NEGATIVE
LYMPHOCYTES ABSOLUTE: 1.5 K/UL (ref 1–5.1)
LYMPHOCYTES RELATIVE PERCENT: 36.5 %
MCH RBC QN AUTO: 30.9 PG (ref 26–34)
MCHC RBC AUTO-ENTMCNC: 34.3 G/DL (ref 31–36)
MCV RBC AUTO: 90.1 FL (ref 80–100)
MICROSCOPIC EXAMINATION: YES
MONOCYTES ABSOLUTE: 0.3 K/UL (ref 0–1.3)
MONOCYTES RELATIVE PERCENT: 7.6 %
NEUTROPHILS ABSOLUTE: 2.2 K/UL (ref 1.7–7.7)
NEUTROPHILS RELATIVE PERCENT: 54.2 %
NITRITE, URINE: NEGATIVE
PDW BLD-RTO: 12.8 % (ref 12.4–15.4)
PH UA: 7.5 (ref 5–8)
PLATELET # BLD: 161 K/UL (ref 135–450)
PMV BLD AUTO: 9.5 FL (ref 5–10.5)
POTASSIUM SERPL-SCNC: 4.6 MMOL/L (ref 3.5–5.1)
PROTEIN PROTEIN: 9 MG/DL
PROTEIN UA: ABNORMAL MG/DL
RBC # BLD: 4.55 M/UL (ref 4–5.2)
RBC UA: 3 /HPF (ref 0–4)
SEDIMENTATION RATE, ERYTHROCYTE: 1 MM/HR (ref 0–20)
SODIUM BLD-SCNC: 140 MMOL/L (ref 136–145)
SPECIFIC GRAVITY UA: 1.02 (ref 1–1.03)
TOTAL PROTEIN: 6.4 G/DL (ref 6.4–8.2)
URINE REFLEX TO CULTURE: ABNORMAL
URINE TYPE: ABNORMAL
UROBILINOGEN, URINE: 0.2 E.U./DL
WBC # BLD: 4 K/UL (ref 4–11)
WBC UA: 2 /HPF (ref 0–5)

## 2021-07-15 PROCEDURE — 99214 OFFICE O/P EST MOD 30 MIN: CPT | Performed by: INTERNAL MEDICINE

## 2021-07-15 PROCEDURE — G8427 DOCREV CUR MEDS BY ELIG CLIN: HCPCS | Performed by: INTERNAL MEDICINE

## 2021-07-15 RX ORDER — NAPROXEN 500 MG/1
TABLET ORAL
COMMUNITY
End: 2022-01-31

## 2021-07-16 LAB — ANTI-DSDNA IGG: 2 IU/ML (ref 0–9)

## 2021-07-23 DIAGNOSIS — L93.2 CUTANEOUS LUPUS ERYTHEMATOSUS: ICD-10-CM

## 2021-07-23 RX ORDER — HYDROXYCHLOROQUINE SULFATE 200 MG/1
TABLET, FILM COATED ORAL
Qty: 45 TABLET | Refills: 3 | Status: SHIPPED | OUTPATIENT
Start: 2021-07-23 | End: 2021-12-17 | Stop reason: SDUPTHER

## 2021-07-23 NOTE — TELEPHONE ENCOUNTER
Last visit 07/15/21  Labs 07/15/21  Next visit None Breath Sounds equal & clear to percussion & auscultation, no accessory muscle use

## 2021-09-20 ENCOUNTER — TELEPHONE (OUTPATIENT)
Dept: INTERNAL MEDICINE CLINIC | Age: 51
End: 2021-09-20

## 2021-09-20 RX ORDER — TRIAMCINOLONE ACETONIDE 0.1 %
PASTE (GRAM) DENTAL
Qty: 5 G | Refills: 1 | Status: SHIPPED | OUTPATIENT
Start: 2021-09-20 | End: 2022-01-31

## 2021-09-20 NOTE — TELEPHONE ENCOUNTER
Please call the patient and let her know that I will call him for Kenalog mouth based to be applied twice a day along with the Magic mouthwash as needed. Prescription will be sent to the pharmacy. I am not sure about the heartburn.   She should talk with her PCP and consider referral to GI if still persistent

## 2021-09-20 NOTE — TELEPHONE ENCOUNTER
Patient states she has lupus and has had sores in her mouth for about 1 week. She states sores worsened over the last 2-3 days. Patient also states she has seen a CNP at a clinic her employer contracts with for very bad heartburn for the last month leaving her with a sore throat. They have tried 2 medications that she does not feel have helped her. She doesn't know if there is a connection between the conditions. Patient didn't know if Dr Te Basurto wants to see her in office or if there is something OTC or a prescription that will give her some relief from the mouth sores. She uses [address] on [employer] [address][phone].

## 2021-12-17 DIAGNOSIS — L93.2 CUTANEOUS LUPUS ERYTHEMATOSUS: ICD-10-CM

## 2021-12-17 RX ORDER — HYDROXYCHLOROQUINE SULFATE 200 MG/1
TABLET, FILM COATED ORAL
Qty: 45 TABLET | Refills: 3 | Status: SHIPPED | OUTPATIENT
Start: 2021-12-17 | End: 2022-03-31 | Stop reason: SDUPTHER

## 2022-01-31 ENCOUNTER — ANESTHESIA EVENT (OUTPATIENT)
Dept: ENDOSCOPY | Age: 52
End: 2022-01-31
Payer: COMMERCIAL

## 2022-01-31 NOTE — PROGRESS NOTES
[facility] ENDOSCOPY COLONOSCOPY PRE-OPERATIVE INSTRUCTIONS    Procedure date_02/01/2022________  Arrival time_____0900_______          Surgery time_____1000Preoperative Screening for Elective Surgery/Invasive Procedures While COVID-19 present in the community     Have you had any of the following symptoms? o Fever, chills  o Cough  o Shortness of breath  o Muscle aches/pain  o Diarrhea  o Abdominal pain, nausea, vomiting  o Loss or decrease in taste and / or smell   Risk of Exposure  o Have you recently been hospitalized for COVID-19 or flu-like illness, if so when?  o Recently diagnosed with COVID-19, if so when?  o Recently tested for COVID-19, if so when?  o Have you been in close contact with a person or family member who currently has or recently had COVID-19? If yes, when and in what context?  o Do you live with anybody who in the last 14 days has had fever, chills, shortness of breath, muscle aches, flu-like illness?  o Do you have any close contacts or family members who are currently in the hospital for COVID-19 or flu-like illness? If yes, assess recent close contact with this person. Indicate if the patient has a positive screen by answering yes to one or more of the above questions. Patients who test positive or screen positive prior to surgery or on the day of surgery should be evaluated in conjunction with the surgeon/proceduralist/anesthesiologist to determine the urgency of the procedure. Patient was at a conference in [place] last week and travelled. Has been vaccinated. Clear liquids the day before the procedure. Do not eat or drink anything within 5 hours of your procedure. This includes water chewing gum, mints and ice chips.    You may brush your teeth and gargle the morning of your surgery, but do not swallow the water    You may be asked to stop blood thinners such as Coumadin, Plavix, Fragmin, Lovenox, etc., or any anti-inflammatories such as:  Aspirin, Ibuprofen, Advil, Naproxen prior to your procedure. We also ask that you stop any OTC medications such as fish oil, vitamin E, glucosamine, garlic, Multivitamins, COQ 10, etc.    You must make arrangements for a responsible adult to arrive with you and stay in our waiting area during your procedure. They will also need to take you home after your procedure. For your safety you will not be allowed to leave alone or drive yourself home. Also for your safety, it is strongly suggested that someone stay with you the first 24 hours after your procedure. For your comfort, please wear simple loose fitting clothing to the center. Please do not bring valuables. If you have a living will and a durable power of  for healthcare, please bring in a copy.      You will need to bring a photo ID and insurance card    Our goal is to provide you with excellent care so if you have any questions, please contact us at the [facility] at [phone]         Please note these are generalized instructions for all colonoscopy cases, you may be provided with more specific instructions if necessary

## 2022-02-01 ENCOUNTER — HOSPITAL ENCOUNTER (OUTPATIENT)
Age: 52
Setting detail: OUTPATIENT SURGERY
Discharge: HOME | End: 2022-02-01
Attending: INTERNAL MEDICINE | Admitting: INTERNAL MEDICINE
Payer: COMMERCIAL

## 2022-02-01 ENCOUNTER — ANESTHESIA (OUTPATIENT)
Dept: ENDOSCOPY | Age: 52
End: 2022-02-01
Payer: COMMERCIAL

## 2022-02-01 VITALS
DIASTOLIC BLOOD PRESSURE: 84 MMHG | TEMPERATURE: 96.6 F | HEART RATE: 93 BPM | SYSTOLIC BLOOD PRESSURE: 104 MMHG | WEIGHT: 160 LBS | HEIGHT: 65 IN | BODY MASS INDEX: 26.66 KG/M2 | OXYGEN SATURATION: 98 % | RESPIRATION RATE: 16 BRPM

## 2022-02-01 VITALS — DIASTOLIC BLOOD PRESSURE: 68 MMHG | OXYGEN SATURATION: 100 % | SYSTOLIC BLOOD PRESSURE: 96 MMHG

## 2022-02-01 PROCEDURE — 3609027000 HC COLONOSCOPY: Performed by: INTERNAL MEDICINE

## 2022-02-01 PROCEDURE — 2580000003 HC RX 258: Performed by: NURSE ANESTHETIST, CERTIFIED REGISTERED

## 2022-02-01 PROCEDURE — 3700000001 HC ADD 15 MINUTES (ANESTHESIA): Performed by: INTERNAL MEDICINE

## 2022-02-01 PROCEDURE — 3700000000 HC ANESTHESIA ATTENDED CARE: Performed by: INTERNAL MEDICINE

## 2022-02-01 PROCEDURE — 2500000003 HC RX 250 WO HCPCS: Performed by: NURSE ANESTHETIST, CERTIFIED REGISTERED

## 2022-02-01 PROCEDURE — 7100000011 HC PHASE II RECOVERY - ADDTL 15 MIN: Performed by: INTERNAL MEDICINE

## 2022-02-01 PROCEDURE — 7100000010 HC PHASE II RECOVERY - FIRST 15 MIN: Performed by: INTERNAL MEDICINE

## 2022-02-01 PROCEDURE — 3609017100 HC EGD: Performed by: INTERNAL MEDICINE

## 2022-02-01 PROCEDURE — 2580000003 HC RX 258: Performed by: STUDENT IN AN ORGANIZED HEALTH CARE EDUCATION/TRAINING PROGRAM

## 2022-02-01 PROCEDURE — 6360000002 HC RX W HCPCS: Performed by: NURSE ANESTHETIST, CERTIFIED REGISTERED

## 2022-02-01 RX ORDER — SODIUM CHLORIDE 9 MG/ML
25 INJECTION, SOLUTION INTRAVENOUS PRN
Status: DISCONTINUED | OUTPATIENT
Start: 2022-02-01 | End: 2022-02-01 | Stop reason: HOSPADM

## 2022-02-01 RX ORDER — SODIUM CHLORIDE 9 MG/ML
INJECTION, SOLUTION INTRAVENOUS CONTINUOUS
Status: DISCONTINUED | OUTPATIENT
Start: 2022-02-01 | End: 2022-02-01 | Stop reason: HOSPADM

## 2022-02-01 RX ORDER — LIDOCAINE HYDROCHLORIDE 20 MG/ML
INJECTION, SOLUTION EPIDURAL; INFILTRATION; INTRACAUDAL; PERINEURAL PRN
Status: DISCONTINUED | OUTPATIENT
Start: 2022-02-01 | End: 2022-02-01 | Stop reason: SDUPTHER

## 2022-02-01 RX ORDER — GLYCOPYRROLATE 0.2 MG/ML
INJECTION INTRAMUSCULAR; INTRAVENOUS PRN
Status: DISCONTINUED | OUTPATIENT
Start: 2022-02-01 | End: 2022-02-01 | Stop reason: SDUPTHER

## 2022-02-01 RX ORDER — SODIUM CHLORIDE 0.9 % (FLUSH) 0.9 %
5-40 SYRINGE (ML) INJECTION PRN
Status: DISCONTINUED | OUTPATIENT
Start: 2022-02-01 | End: 2022-02-01 | Stop reason: HOSPADM

## 2022-02-01 RX ORDER — PROPOFOL 10 MG/ML
INJECTION, EMULSION INTRAVENOUS PRN
Status: DISCONTINUED | OUTPATIENT
Start: 2022-02-01 | End: 2022-02-01 | Stop reason: SDUPTHER

## 2022-02-01 RX ORDER — SODIUM CHLORIDE 0.9 % (FLUSH) 0.9 %
5-40 SYRINGE (ML) INJECTION EVERY 12 HOURS SCHEDULED
Status: DISCONTINUED | OUTPATIENT
Start: 2022-02-01 | End: 2022-02-01 | Stop reason: HOSPADM

## 2022-02-01 RX ORDER — SODIUM CHLORIDE 9 MG/ML
INJECTION, SOLUTION INTRAVENOUS CONTINUOUS PRN
Status: DISCONTINUED | OUTPATIENT
Start: 2022-02-01 | End: 2022-02-01 | Stop reason: SDUPTHER

## 2022-02-01 RX ADMIN — LIDOCAINE HYDROCHLORIDE 100 MG: 20 INJECTION, SOLUTION EPIDURAL; INFILTRATION; INTRACAUDAL; PERINEURAL at 09:00

## 2022-02-01 RX ADMIN — PROPOFOL 50 MG: 10 INJECTION, EMULSION INTRAVENOUS at 09:03

## 2022-02-01 RX ADMIN — SODIUM CHLORIDE: 9 INJECTION, SOLUTION INTRAVENOUS at 08:40

## 2022-02-01 RX ADMIN — GLYCOPYRROLATE 0.2 MG: 0.2 INJECTION, SOLUTION INTRAMUSCULAR; INTRAVENOUS at 08:56

## 2022-02-01 RX ADMIN — PROPOFOL 50 MG: 10 INJECTION, EMULSION INTRAVENOUS at 09:12

## 2022-02-01 RX ADMIN — PROPOFOL 50 MG: 10 INJECTION, EMULSION INTRAVENOUS at 09:09

## 2022-02-01 RX ADMIN — PROPOFOL 100 MG: 10 INJECTION, EMULSION INTRAVENOUS at 09:00

## 2022-02-01 RX ADMIN — SODIUM CHLORIDE: 9 INJECTION, SOLUTION INTRAVENOUS at 08:56

## 2022-02-01 RX ADMIN — PROPOFOL 50 MG: 10 INJECTION, EMULSION INTRAVENOUS at 09:06

## 2022-02-01 ASSESSMENT — PAIN SCALES - GENERAL
PAINLEVEL_OUTOF10: 0

## 2022-02-01 ASSESSMENT — PAIN - FUNCTIONAL ASSESSMENT: PAIN_FUNCTIONAL_ASSESSMENT: 0-10

## 2022-02-01 ASSESSMENT — LIFESTYLE VARIABLES: SMOKING_STATUS: 0

## 2022-02-01 NOTE — H&P
Frequency of Communication with Friends and Family: Not on file    Frequency of Social Gatherings with Friends and Family: Not on file    Attends Buddhism Services: Not on file    Active Member of Clubs or Organizations: Not on file    Attends Club or Organization Meetings: Not on file    Marital Status: Not on file   Intimate Partner Violence:     Fear of Current or Ex-Partner: Not on file    Emotionally Abused: Not on file    Physically Abused: Not on file    Sexually Abused: Not on file   Housing Stability:     Unable to Pay for Housing in the Last Year: Not on file    Number of [place] in the Last Year: Not on file    Unstable Housing in the Last Year: Not on file      History reviewed. No pertinent family history. Allergies: Allergies   Allergen Reactions    Peanut Oil      Other reaction(s): Headache    Pineapple      Other reaction(s): Headache     Medications:   Prior to Admission medications    Medication Sig Start Date End Date Taking?  Authorizing Provider   hydroxychloroquine (PLAQUENIL) 200 MG tablet TAKE 1 AND 1/2 TABLETS BY MOUTH DAILY 12/17/21  Yes Elmo Wade MD   magnesium 30 MG tablet Take 30 mg by mouth daily   Yes Historical Provider, MD   b complex vitamins capsule Take 1 capsule by mouth daily   Yes Historical Provider, MD   Omega-3 Fatty Acids (OMEGA 3 PO) Take by mouth daily   Yes Historical Provider, MD   PARoxetine (PAXIL) 10 MG tablet Take 10 mg by mouth every morning   Yes Historical Provider, MD   finasteride (PROSCAR) 5 MG tablet Take 5 mg by mouth daily  9/11/19  Yes Historical Provider, MD   montelukast (SINGULAIR) 10 MG tablet Take 10 mg by mouth nightly  3/5/19  Yes Historical Provider, MD   norethindrone-ethinyl estradiol (JINTELI) 1-5 MG-MCG TABS per tablet 1 tablet daily  1/14/19  Yes Historical Provider, MD   SUMAtriptan (IMITREX) 100 MG tablet Take 100 mg by mouth as needed  3/27/19  Yes Historical Provider, MD   clobetasol (TEMOVATE) 0.05 % external solution Apply topically as needed  4/2/19  Yes Historical Provider, MD   Ferrous Sulfate (IRON) 325 (65 Fe) MG TABS Take by mouth daily Not since 1/28/2022   Yes Historical Provider, MD   cetirizine (ZYRTEC) 10 MG tablet Take 10 mg by mouth daily   Yes Historical Provider, MD   albuterol sulfate HFA (PROAIR HFA) 108 (90 Base) MCG/ACT inhaler Inhale 2 puffs into the lungs every 6 hours as needed for Wheezing    Historical Provider, MD       Vital Signs: /77   Pulse 86   Temp 97.6 °F (36.4 °C) (Temporal)   Resp 16   Ht 5' 5\" (1.651 m)   Wt 160 lb (72.6 kg)   SpO2 100%   BMI 26.63 kg/m²   Physical Exam:   Heart: regular   Lungs: non-labored breathing  Mental status:  Alert and oriented    ASA score:  ASA 2 - Patient with mild systemic disease with no functional limitations{  Mallimpati score:  2     Planned Procedure: egd and colonoscopy    Planned Sedation:  Monitored anesthesia.     Signed By: Luciano Dickey MD   February 1, 2022

## 2022-02-01 NOTE — OP NOTE
COLONOSCOPY     Patient: Yola Boudreaux MRN: [id number]   YOB: 1970 Age: 54 y.o. Sex: female       Admitting Physician: Joselito Funes     Primary Care Physician: Khanh Cantor MD      DATE OF PROCEDURE: 2/1/2022  PROCEDURE: Colonoscopy    PREOPERATIVE DIAGNOSIS: Screen for colon cancer  HPI: This is a 54y.o. year old female who presents today for colon cancer screening and screening colonoscopy. ENDOSCOPIST: Shikha Reno MD    POSTOPERATIVE DIAGNOSIS:    1.  Negative colonoscopy    PLAN:   1.  Screening colonoscopy in 10 years    INFORMED CONSENT:  Informed consent for colonoscopy was obtained. The benefits and risks including adverse medicine reaction and perforation have been explained. The patient's questions were answered and the patient agreed to proceed. ASA: ASA 2 - Patient with mild systemic disease with no functional limitations     SEDATION: MAC    The patient's vital signs, cardiac status, pulmonary status, abdominal status and mental status were stable for the procedure. The patient's vital signs and respiratory function as monitored by oxygen saturation remained stable. COLON PREPARATION:  The patient was given a split colon preparation and the preparation was adequate. Procedure Details: An anal exam was performed and this was unremarkable. A digital rectal exam was performed and no masses palpated. The Olympus videocolonoscope  was inserted in the rectum and carefully advanced to the cecum as identified by IC valve, crow's foot appearance and appendix. The cecum was photodocumented. The colonoscope was slowly withdrawn and retrograde examination of the colon was carefully performed with inspection around and between folds. The ascending colon and cecum were intubated twice with repeat antegrade and retrograde examination. Retroflexion in the rectum was performed. Cecum Intubated: Yes    Findings:    There are no polyps or tumors.     Estimated Blood Loss:  None  Complications: None    Signed By: Scout Watson MD

## 2022-02-01 NOTE — ANESTHESIA POSTPROCEDURE EVALUATION
Department of Anesthesiology  Postprocedure Note    Patient: Addison Daniels  MRN: [id number]  YOB: 1970  Date of evaluation: 2/1/2022  Time:  10:02 AM     Procedure Summary     Date: 02/01/22 Room / Location: [address]    Anesthesia Start: 0050 Anesthesia Stop: 0922    Procedures:       COLORECTAL CANCER SCREENING, NOT HIGH RISK (N/A )      EGD ESOPHAGOGASTRODUODENOSCOPY (N/A ) Diagnosis:       Screen for colon cancer      Atypical chest pain      (Screen for colon cancer, Atypical chest pain)    Surgeons: Jenni Fernandes MD Responsible Provider: Felicity Craven MD    Anesthesia Type: MAC ASA Status: 2          Anesthesia Type: MAC    Iglesia Phase I: Iglesia Score: 10    Iglesia Phase II: Iglesia Score: 10    Last vitals: Reviewed and per EMR flowsheets.        Anesthesia Post Evaluation    Patient location during evaluation: PACU  Patient participation: complete - patient participated  Level of consciousness: awake and alert  Pain score: 0  Airway patency: patent  Nausea & Vomiting: no nausea and no vomiting  Complications: no  Cardiovascular status: blood pressure returned to baseline  Respiratory status: acceptable  Hydration status: euvolemic

## 2022-02-01 NOTE — ANESTHESIA PRE PROCEDURE
[facility] Department of Anesthesiology  Pre-Anesthesia Evaluation/Consultation       Name:  Ac Fraser  : 1970  Age:  54 y.o. MRN:  [id number]  Date: 2022           Surgeon: Surgeon(s):  Christina Kat MD    Procedure: Procedure(s):  COLORECTAL CANCER SCREENING, NOT HIGH RISK  EGD ESOPHAGOGASTRODUODENOSCOPY     Allergies   Allergen Reactions    Peanut Oil      Other reaction(s): Headache    Pineapple      Other reaction(s): Headache     There is no problem list on file for this patient. Past Medical History:   Diagnosis Date    Anemia     Anxiety 2019    Cervical radiculopathy 2019    GERD (gastroesophageal reflux disease) 2019    Headache 2019    migraines    Lupus erythematosus 2019    cutaneous    Scarlet fever      History reviewed. No pertinent surgical history. Social History     Tobacco Use    Smoking status: Former Smoker     Quit date:      Years since quittin.0    Smokeless tobacco: Never Used   Vaping Use    Vaping Use: Never used   Substance Use Topics    Alcohol use: Yes     Comment: Rarely    Drug use: Never     Medications  No current facility-administered medications on file prior to encounter.      Current Outpatient Medications on File Prior to Encounter   Medication Sig Dispense Refill    hydroxychloroquine (PLAQUENIL) 200 MG tablet TAKE 1 AND 1/2 TABLETS BY MOUTH DAILY 45 tablet 3    magnesium 30 MG tablet Take 30 mg by mouth daily      b complex vitamins capsule Take 1 capsule by mouth daily      Omega-3 Fatty Acids (OMEGA 3 PO) Take by mouth daily      PARoxetine (PAXIL) 10 MG tablet Take 10 mg by mouth every morning      finasteride (PROSCAR) 5 MG tablet Take 5 mg by mouth daily       montelukast (SINGULAIR) 10 MG tablet Take 10 mg by mouth nightly       norethindrone-ethinyl estradiol (JINTELI) 1-5 MG-MCG TABS per tablet 1 tablet daily       SUMAtriptan (IMITREX) 100 MG tablet Take 100 mg by mouth as needed       clobetasol (TEMOVATE) 0.05 % external solution Apply topically as needed       Ferrous Sulfate (IRON) 325 (65 Fe) MG TABS Take by mouth daily Not since 2022      cetirizine (ZYRTEC) 10 MG tablet Take 10 mg by mouth daily      albuterol sulfate HFA (PROAIR HFA) 108 (90 Base) MCG/ACT inhaler Inhale 2 puffs into the lungs every 6 hours as needed for Wheezing       Current Facility-Administered Medications   Medication Dose Route Frequency Provider Last Rate Last Admin    0.9 % sodium chloride infusion   IntraVENous Continuous Giana Flood MD        sodium chloride flush 0.9 % injection 5-40 mL  5-40 mL IntraVENous 2 times per day Giana Flood MD        sodium chloride flush 0.9 % injection 5-40 mL  5-40 mL IntraVENous PRN Giana Flood MD        0.9 % sodium chloride infusion  25 mL IntraVENous PRN Giana Flood MD         Vital Signs (Current)   Vitals:    22 0914 22 1132 22   BP:   123/77   Pulse:   86   Resp:   16   Temp:   97.6 °F (36.4 °C)   TempSrc:   Temporal   SpO2:   100%   Weight: 162 lb (73.5 kg) 160 lb (72.6 kg) 160 lb (72.6 kg)   Height: 5' 5\" (1.651 m) 5' 5\" (1.651 m) 5' 5\" (1.651 m)                                            Vital Signs Statistics (for past 48 hrs)     Temp  Av.6 °F (36.4 °C)  Min: 97.6 °F (36.4 °C)   Min taken time: 22  Max: 97.6 °F (36.4 °C)   Max taken time: 22  Pulse  Av  Min: 80   Min taken time: 22  Max: 80   Max taken time: 22  Resp  Av  Min: 12   Min taken time: 22  Max: 12   Max taken time: 22  BP  Min: 123/77   Min taken time: 22  Max: 123/77   Max taken time: 22 0825  SpO2  Av %  Min: 100 %   Min taken time: 22  Max: 100 %   Max taken time: 22  BP Readings from Last 3 Encounters:   22 123/77   10/09/19 121/71   19 110/71       BMI  Body mass index is 26.63 kg/m². Estimated body mass index is 26.63 kg/m² as calculated from the following:    Height as of this encounter: 5' 5\" (1.651 m). Weight as of this encounter: 160 lb (72.6 kg). CBC   Lab Results   Component Value Date    WBC 4.0 07/15/2021    RBC 4.55 07/15/2021    HGB 14.1 07/15/2021    HCT 41.0 07/15/2021    MCV 90.1 07/15/2021    RDW 12.8 07/15/2021     07/15/2021     CMP    Lab Results   Component Value Date     07/15/2021    K 4.6 07/15/2021     07/15/2021    CO2 23 07/15/2021    BUN 14 07/15/2021    CREATININE 0.8 07/15/2021    GFRAA >60 07/15/2021    AGRATIO 1.7 07/15/2021    LABGLOM >60 07/15/2021    GLUCOSE 97 07/15/2021    PROT 6.4 07/15/2021    CALCIUM 8.9 07/15/2021    BILITOT 0.7 07/15/2021    ALKPHOS 54 07/15/2021    AST 25 07/15/2021    ALT 22 07/15/2021     BMP    Lab Results   Component Value Date     07/15/2021    K 4.6 07/15/2021     07/15/2021    CO2 23 07/15/2021    BUN 14 07/15/2021    CREATININE 0.8 07/15/2021    CALCIUM 8.9 07/15/2021    GFRAA >60 07/15/2021    LABGLOM >60 07/15/2021    GLUCOSE 97 07/15/2021     POCGlucose  No results for input(s): GLUCOSE in the last 72 hours.    Coags  No results found for: PROTIME, INR, APTT  HCG (If Applicable)   Lab Results   Component Value Date    PREGTESTUR Negative 11/17/2014      ABGs No results found for: PHART, PO2ART, IKK5FTQ, LTK1QQP, BEART, S6OOSEOT   Type & Screen (If Applicable)  No results found for: LABABO, LABRH                         BMI: Wt Readings from Last 3 Encounters:       NPO Status:   Date of last liquid consumption: 02/01/22   Time of last liquid consumption: 0500   Date of last solid food consumption: 01/30/22      Time of last solid consumption: 2000       Anesthesia Evaluation  Patient summary reviewed no history of anesthetic complications:   Airway: Mallampati: II  TM distance: >3 FB   Neck ROM: full  Mouth opening: > = 3 FB Dental: normal exam         Pulmonary: breath sounds clear to auscultation      (-) COPD, asthma, sleep apnea and not a current smoker                           Cardiovascular:  Exercise tolerance: good (>4 METS),       (-) hypertension, past MI, CABG/stent and no hyperlipidemia        Rate: normal                    Neuro/Psych:   (+) headaches:, depression/anxiety    (-) seizures, TIA and CVA           GI/Hepatic/Renal:   (+) GERD:, bowel prep,           Endo/Other:        (-) diabetes mellitus, hypothyroidism                ROS comment: Cutaneous lupus Abdominal:             Vascular:     - DVT and PE. Other Findings:             Anesthesia Plan      MAC     ASA 2       Induction: intravenous. Anesthetic plan and risks discussed with patient. Plan discussed with CRNA. This pre-anesthesia assessment may be used as a history and physical.    DOS STAFF ADDENDUM:    Pt seen and examined, chart reviewed (including anesthesia, drug and allergy history). No interval changes to history and physical examination. Anesthetic plan, risks, benefits, alternatives, and personnel involved discussed with patient. Patient verbalized an understanding and agrees to proceed.       Felicity Craven MD  February 1, 2022  8:36 AM

## 2022-03-31 ENCOUNTER — OFFICE VISIT (OUTPATIENT)
Dept: RHEUMATOLOGY | Age: 52
End: 2022-03-31
Payer: COMMERCIAL

## 2022-03-31 VITALS
HEART RATE: 79 BPM | BODY MASS INDEX: 25.96 KG/M2 | SYSTOLIC BLOOD PRESSURE: 115 MMHG | DIASTOLIC BLOOD PRESSURE: 73 MMHG | WEIGHT: 156 LBS

## 2022-03-31 DIAGNOSIS — M15.9 PRIMARY OSTEOARTHRITIS INVOLVING MULTIPLE JOINTS: ICD-10-CM

## 2022-03-31 DIAGNOSIS — Z79.899 LONG-TERM USE OF PLAQUENIL: ICD-10-CM

## 2022-03-31 DIAGNOSIS — L93.2 CUTANEOUS LUPUS ERYTHEMATOSUS: Primary | ICD-10-CM

## 2022-03-31 DIAGNOSIS — L93.2 CUTANEOUS LUPUS ERYTHEMATOSUS: ICD-10-CM

## 2022-03-31 PROCEDURE — G8419 CALC BMI OUT NRM PARAM NOF/U: HCPCS | Performed by: INTERNAL MEDICINE

## 2022-03-31 PROCEDURE — 99214 OFFICE O/P EST MOD 30 MIN: CPT | Performed by: INTERNAL MEDICINE

## 2022-03-31 PROCEDURE — 3017F COLORECTAL CA SCREEN DOC REV: CPT | Performed by: INTERNAL MEDICINE

## 2022-03-31 PROCEDURE — 1036F TOBACCO NON-USER: CPT | Performed by: INTERNAL MEDICINE

## 2022-03-31 PROCEDURE — G8484 FLU IMMUNIZE NO ADMIN: HCPCS | Performed by: INTERNAL MEDICINE

## 2022-03-31 PROCEDURE — G8427 DOCREV CUR MEDS BY ELIG CLIN: HCPCS | Performed by: INTERNAL MEDICINE

## 2022-03-31 RX ORDER — PANTOPRAZOLE SODIUM 40 MG/1
TABLET, DELAYED RELEASE ORAL
COMMUNITY

## 2022-03-31 RX ORDER — SUMATRIPTAN 50 MG/1
TABLET, FILM COATED ORAL
COMMUNITY
Start: 2022-01-27 | End: 2022-03-31

## 2022-03-31 RX ORDER — HYDROXYCHLOROQUINE SULFATE 200 MG/1
TABLET, FILM COATED ORAL
Qty: 135 TABLET | Refills: 1 | Status: SHIPPED | OUTPATIENT
Start: 2022-03-31 | End: 2022-09-30 | Stop reason: SDUPTHER

## 2022-03-31 RX ORDER — TRIAMCINOLONE ACETONIDE 1 MG/G
CREAM TOPICAL
COMMUNITY
Start: 2022-02-07 | End: 2022-03-31

## 2022-03-31 NOTE — PROGRESS NOTES
3/31/2022   Patient Name: Kortney Pierce  : 1970  Medical Record: [id number]    MEDICATIONS  Current Outpatient Medications   Medication Sig Dispense Refill    pantoprazole (PROTONIX) 40 MG tablet Protonix 40 mg tablet,delayed release   Take 1 tablet every day by oral route.  hydroxychloroquine (PLAQUENIL) 200 MG tablet Alternate 1 tab daily with 1 tab twice a day 135 tablet 1    magnesium 30 MG tablet Take 30 mg by mouth daily      b complex vitamins capsule Take 1 capsule by mouth daily      Omega-3 Fatty Acids (OMEGA 3 PO) Take by mouth daily      PARoxetine (PAXIL) 10 MG tablet Take 10 mg by mouth every morning      finasteride (PROSCAR) 5 MG tablet Take 5 mg by mouth daily       montelukast (SINGULAIR) 10 MG tablet Take 10 mg by mouth nightly       norethindrone-ethinyl estradiol (JINTELI) 1-5 MG-MCG TABS per tablet 1 tablet daily       SUMAtriptan (IMITREX) 100 MG tablet Take 100 mg by mouth as needed       clobetasol (TEMOVATE) 0.05 % external solution Apply topically as needed       Ferrous Sulfate (IRON) 325 (65 Fe) MG TABS Take by mouth daily Not since 2022      cetirizine (ZYRTEC) 10 MG tablet Take 10 mg by mouth daily       No current facility-administered medications for this visit. ALLERGIES  Allergies   Allergen Reactions    Peanut Oil      Other reaction(s): Headache    Pineapple      Other reaction(s): Headache         Comments  No specialty comments available. Background history:  Kortney Pierce is a 50 y.o. 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 She presented with a rash on the scalp with scarring alopecia about 6 months ago. 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 She underwent skin biopsy which showed interface dermatitis with basal vacuolization and superficial perivascular lymphocytic infiltrate and perifollicular plugging consistent with cutaneous lupus/discoid lupus.   She also has erythematous rash on her chest and neck. She was referred to rule out systemic lupus. She gets intermittent low-grade fevers for past several years. She also has migraine headaches, malar rash, photosensitivity, fatigue. She denies joint pain or swelling or stiffness. She denies oral or nasal ulcers, dry eyes and dry mouth, history of pleurisy or pericarditis, blood clots, miscarriages, pregnancy complications, sclerodactyly, and raynaud's, dysphagia, heartburn. She denies any muscle weakness. She denies any family history of lupus or systemic rheumatic disease. Workup by dermatology showed DARA direct negative. She was given intralesional Kenalog and topical Kenalog. Interim history: She presents for follow-up cutaneous lupus. She follows a dermatologist and is using topical steroids. She is on Plaquenil 300 mg daily. Her symptoms have improved since starting Plaquenil. She occasionally gets flareups on the scalp for which he uses topical steroid. She denies malar rash, photosensitivity, oral/nasal ulcers, dry eyes/dry mouth, joint pain, Raynaud's, blood clots or kidney diseases. She has no other signs and symptoms of lupus or systemic rheumatic disease. HPI  Her blood work showed low positive DARA 1:80 speckled pattern. Complements, sub-serologies, APL panel negative. She has no proteinuria. She denies any side effects with Plaquenil. She is overdue for eye exam.    HPI  Review of Systems    REVIEW OF SYSTEMS:   Constitutional: No unanticipated weight loss  Integumentary: No  livedo reticularis,  Raynaud's symptoms, sclerodactyly, skin tightening  Eyes: negative for visual disturbance and persistent redness, discharge from eyes   ENT: - No tinnitus, loss of hearing, vertigo, or recurrent ear infections.  - No history of nasal/oral ulcers.   - No history of dry eyes/dry mouth  Cardiovascular: No history of pericarditis, chest pain or murmur or palpitations  Respiratory: No shortness of breath, cough or history of interstitial lung disease. No history of pleurisy. No history of tuberculosis or atypical infections. Gastrointestinal: No history of heart burn, dysphagia or esophageal dysmotility. No change in bowel habits or any inflammatory bowel disease. Genitourinary: No history renal disease, miscarriages. Hematologic/Lymphatic: No abnormal bruising or bleeding, blood clots or swollen lymph nodes. Neurological: No history of seizure or focal weakness. No history of neuropathies, paresthesias or hyperesthesias, facial droop, diplopia  Psychiatric: No history of bipolar disease, depression  Endocrine: Denies any polyuria, polydipsia and osteoporosis  Allergic/Immunologic: No nasal congestion or hives. I have reviewed patients Past medical History, Social History and Family History as mentioned in her chart and this remains unchanged fromprevious. Past Medical History:   Diagnosis Date    Anemia     Anxiety 2019    Cervical radiculopathy 2019    GERD (gastroesophageal reflux disease) 2019    Headache 2019    migraines    Lupus erythematosus 2019    cutaneous    Scarlet fever      Past Surgical History:   Procedure Laterality Date    COLONOSCOPY N/A 2022    COLORECTAL CANCER SCREENING, NOT HIGH RISK performed by Iwona Castano MD at [address] [place] [address] ENDOSCOPY N/A 2022    EGD ESOPHAGOGASTRODUODENOSCOPY performed by Iwona Castano MD at [facility] Marital status: Single     Spouse name: Not on file    Number of children: Not on file    Years of education: Not on file    Highest education level: Not on file   Occupational History    Not on file   Tobacco Use    Smoking status: Former Smoker     Quit date:      Years since quittin.2    Smokeless tobacco: Never Used   Vaping Use    Vaping Use: Never used   Substance and Sexual Activity    Alcohol use:  Yes Comment: Rarely    Drug use: Never    Sexual activity: Not on file   Other Topics Concern    Not on file   Social History Narrative    Not on file     Social Determinants of Health     Financial Resource Strain:     Difficulty of Paying Living Expenses: Not on file   Food Insecurity:     Worried About Running Out of Food in the Last Year: Not on file    Sohail of Food in the Last Year: Not on file   Transportation Needs:     Lack of Transportation (Medical): Not on file    Lack of Transportation (Non-Medical): Not on file   Physical Activity:     Days of Exercise per Week: Not on file    Minutes of Exercise per Session: Not on file   Stress:     Feeling of Stress : Not on file   Social Connections:     Frequency of Communication with Friends and Family: Not on file    Frequency of Social Gatherings with Friends and Family: Not on file    Attends Jewish Services: Not on file    Active Member of [address] [place] [address] or Organizations: Not on file    Attends Club or Organization Meetings: Not on file    Marital Status: Not on file   Intimate Partner Violence:     Fear of Current or Ex-Partner: Not on file    Emotionally Abused: Not on file    Physically Abused: Not on file    Sexually Abused: Not on file   Housing Stability:     Unable to Pay for Housing in the Last Year: Not on file    Number of [place] in the Last Year: Not on file    Unstable Housing in the Last Year: Not on file     History reviewed. No pertinent family history.     PHYSICAL EXAMINATION:    Vitals:    03/31/22 0818   BP: 115/73   Pulse: 79   Weight: 156 lb (70.8 kg)         Physical Exam  Constitutional:  Well developed, well nourished, no acute distress, non-toxic appearance   Musculoskeletal:    Ambulates without assistance, normal gait  Neck: Full ROM, no tenderness,supple   Upper Extremities        Shoulder: Full ROM, no crepitus        Elbow:  Full ROM, no synovitis, no deformity        Wrist: Full ROM, no synovitis, no CREATININE 0.8 07/15/2021 1216        Component Value Date/Time    CALCIUM 8.9 07/15/2021 1216    ALKPHOS 54 07/15/2021 1216    AST 25 07/15/2021 1216    ALT 22 07/15/2021 1216    BILITOT 0.7 07/15/2021 1216          Lab Results   Component Value Date    SEDRATE 1 07/15/2021     Lab Results   Component Value Date    CRP <3.0 07/15/2021     Lab Results   Component Value Date    C3 104.3 07/15/2021    C3 119.8 03/04/2021    C3 114.9 10/09/2019    C4 18.1 07/15/2021    C4 18.4 03/04/2021    C4 21.7 10/09/2019     No results found for: RF, CCPABIGG  No results found for: DARA, ANATITER, ANAINT, PATH  No results found for: DSDNAG, DSDNAIGGIFA  No results found for: SSAROAB, SSALAAB  No results found for: SMAB, RNPAB  No results found for: CENTABIGG  Lab Results   Component Value Date    C3 104.3 07/15/2021    C4 18.1 07/15/2021     No results found for: Lorelle Leer, ILL25OPLCR  No results found for: Clorinda Mooring  No results found for: OHOFPXEP19  No results found for: TSHFT4, TSH  No results found for: VITD25    Skin Biopsy 2/11/19  Skin biopsy with interface changes with moderate basal vacuolization and a moderate superficial perivascular lymphocytic infiltrate with numerous lymphocytes found at the dermal epidermal junction. There is a moderate perifollicular infiltrate found at the level of infundibulum/isthmus, there is a mild superficial dermal fibrosis  ASSESSMENT AND PLAN      Assessment/Plan:      ASSESSMENT:    1. Cutaneous lupus erythematosus    2. Long-term use of Plaquenil    3. Primary osteoarthritis involving multiple joints        PLAN:     1. 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 History of low-grade fevers, photosensitivity, fatigue, intermittent malar rash.   -No other signs and symptoms of systemic lupus  -Rash improved after starting Plaquenil  Topical steroids as needed  -Low titer positive DARA 1:80 speckled pattern  dsDNA, SSA/SSB, anti-Smith, RNP, APL panel, C3-C4 negative   No proteinuria  -Advised to continue follow-up with dermatology,   Continue Plaquenil 200 mg daily and alternate with 200 mg twice a day  Sun protection with UVA/UVB SPF 55 or above    -Overdue for eye exam  2. Long-term use of Plaquenil  Overdue for eye exam  -Advised to call us back if she develops any signs and symptoms of systemic lupus       Diagnosis Orders   1. Cutaneous lupus erythematosus  CBC with Auto Differential    Comprehensive Metabolic Panel    C-Reactive Protein    Sedimentation Rate    Anti-DNA Antibody, Double-Stranded    C3 Complement    C4 Complement    hydroxychloroquine (PLAQUENIL) 200 MG tablet   2. Long-term use of Plaquenil     3. Primary osteoarthritis involving multiple joints       The patient indicates understanding of these issues and agrees with the plan. Return in about 6 months (around 9/30/2022). The risks and benefits of my recommendations, as well as other treatment options, benefits and side effects werediscussed with the patient. All questions were answered. ######################################################################    I thank you for giving me theopportunity to participate in [address] [place] [address]. If you have any questions or concerns please feel free to contact me. I look forward to following  Emilie along with you. Electronically signed by: Blair Prajapati MD, MD, 3/31/2022 9:01 AM    Documentation was done using voice recognition dragon software. Every effort was made to ensure accuracy;however, inadvertent unintentional computerized transcription errors may be present.

## 2022-04-01 LAB
A/G RATIO: 2 (ref 1.1–2.2)
ALBUMIN SERPL-MCNC: 4.1 G/DL (ref 3.4–5)
ALP BLD-CCNC: 48 U/L (ref 40–129)
ALT SERPL-CCNC: 30 U/L (ref 10–40)
ANION GAP SERPL CALCULATED.3IONS-SCNC: 13 MMOL/L (ref 3–16)
ANTI-DSDNA IGG: 2 IU/ML (ref 0–9)
AST SERPL-CCNC: 30 U/L (ref 15–37)
BILIRUB SERPL-MCNC: 0.8 MG/DL (ref 0–1)
BUN BLDV-MCNC: 13 MG/DL (ref 7–20)
C-REACTIVE PROTEIN: <3 MG/L (ref 0–5.1)
C3 COMPLEMENT: 108 MG/DL (ref 90–180)
C4 COMPLEMENT: 18.3 MG/DL (ref 10–40)
CALCIUM SERPL-MCNC: 9.3 MG/DL (ref 8.3–10.6)
CHLORIDE BLD-SCNC: 105 MMOL/L (ref 99–110)
CO2: 23 MMOL/L (ref 21–32)
CREAT SERPL-MCNC: 0.9 MG/DL (ref 0.6–1.1)
GFR AFRICAN AMERICAN: >60
GFR NON-AFRICAN AMERICAN: >60
GLUCOSE BLD-MCNC: 86 MG/DL (ref 70–99)
POTASSIUM SERPL-SCNC: 4.6 MMOL/L (ref 3.5–5.1)
SODIUM BLD-SCNC: 141 MMOL/L (ref 136–145)
TOTAL PROTEIN: 6.2 G/DL (ref 6.4–8.2)

## 2022-04-04 ENCOUNTER — TELEPHONE (OUTPATIENT)
Dept: RHEUMATOLOGY | Age: 52
End: 2022-04-04

## 2022-04-04 LAB
REASON FOR REJECTION: NORMAL
REJECTED TEST: NORMAL

## 2022-04-04 NOTE — TELEPHONE ENCOUNTER
Please call the patient and let him know that CBC and sed rate was not done due to lab error.   We will obtain them at next follow-up appointment no wheezing/no cough/no dyspnea

## 2022-07-05 NOTE — PROGRESS NOTES
7/15/2021   Emilie Adams is a 54 y.o. female being evaluated by a Virtual Visit (video visit) encounter to address concerns as mentioned above. A caregiver was present when appropriate. Due to this being a TeleHealth encounter ([facility]), evaluation of the following organ systems was limited: Vitals/Constitutional/EENT/Resp/CV/GI//MS/Neuro/Skin/Heme-Lymph-Imm. Pursuant to the emergency declaration under the [address] [place] [address], [address] [place] [address] authority and the [employer] Act, this Virtual Visit was conducted with patient's (and/or legal guardian's) consent, to reduce the patient's risk of exposure to COVID-19 and provide necessary medical care. The patient (and/or legal guardian) has also been advised to contact this office for worsening conditions or problems, and seek emergency medical treatment and/or call 911 if deemed necessary. Services were provided through a video synchronous discussion virtually to substitute for in-person clinic visit. Patient and provider were located at their individual homes. --Mini Hilario MD on 7/15/2021 at 8:38 AM    An electronic signature was used to authenticate this note. Patient Name: hSona Iqbal  : 1970  Medical Record: [id number]    MEDICATIONS  Current Outpatient Medications   Medication Sig Dispense Refill    naproxen (NAPROSYN) 500 MG tablet naproxen 500 mg tablet   Take 1 tablet twice a day by oral route.       hydroxychloroquine (PLAQUENIL) 200 MG tablet TAKE 1.5 TABLET DAILY 45 tablet 3    magnesium 30 MG tablet Take 30 mg by mouth daily      b complex vitamins capsule Take 1 capsule by mouth daily      Omega-3 Fatty Acids (OMEGA 3 PO) Take by mouth daily      PARoxetine (PAXIL) 10 MG tablet Take 10 mg by mouth every morning      finasteride (PROSCAR) 5 MG tablet Take 5 mg by mouth daily       montelukast (SINGULAIR) 10 MG tablet Take 10 mg by mouth nightly       norethindrone-ethinyl estradiol (JINTELI) 1-5 MG-MCG TABS per tablet 1 tablet daily       SUMAtriptan (IMITREX) 100 MG tablet Take 100 mg by mouth as needed       clobetasol (TEMOVATE) 0.05 % external solution Apply topically as needed       Ferrous Sulfate (IRON) 325 (65 Fe) MG TABS Take by mouth daily      cetirizine (ZYRTEC) 10 MG tablet Take 10 mg by mouth daily      albuterol sulfate HFA (PROAIR HFA) 108 (90 Base) MCG/ACT inhaler Inhale 2 puffs into the lungs every 6 hours as needed for Wheezing       No current facility-administered medications for this visit. ALLERGIES  Allergies   Allergen Reactions    Peanut Oil      Other reaction(s): Headache    Pineapple      Other reaction(s): Headache         Comments  No specialty comments available. Background history:  Yas Luna is a 54 y.o. 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 She presented with a rash on the scalp with scarring alopecia about 6 months ago. 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 She underwent skin biopsy which showed interface dermatitis with basal vacuolization and superficial perivascular lymphocytic infiltrate and perifollicular plugging consistent with cutaneous lupus/discoid lupus. She also has erythematous rash on her chest and neck. She was referred to rule out systemic lupus. She gets intermittent low-grade fevers for past several years. She also has migraine headaches, malar rash, photosensitivity, fatigue. She denies joint pain or swelling or stiffness. She denies oral or nasal ulcers, dry eyes and dry mouth, history of pleurisy or pericarditis, blood clots, miscarriages, pregnancy complications, sclerodactyly, and raynaud's, dysphagia, heartburn. She denies any muscle weakness. She denies any family history of lupus or systemic rheumatic disease.   Workup by dermatology showed DARA direct negative. She was given intralesional Kenalog and topical Kenalog. Interim history: She presents for follow-up cutaneous lupus. She follows a dermatologist and is using topical steroids. She is on Plaquenil 300 mg daily. Her symptoms have improved since starting Plaquenil. She occasionally gets flareups on the scalp for which he uses topical steroid. She had 2 episodes of low-grade fever since last seen. She denies malar rash, photosensitivity, oral/nasal ulcers, dry eyes/dry mouth, joint pain, Raynaud's, blood clots or kidney diseases. She has no other signs and symptoms of lupus or systemic rheumatic disease. HPI  Her blood work showed low positive DARA 1:80 speckled pattern. Complements, sub-serologies, APL panel negative. She has no proteinuria. She denies any side effects with Plaquenil. Recent eye exam normal.     HPI  Review of Systems    REVIEW OF SYSTEMS:   Constitutional: No unanticipated weight loss  Integumentary: No  livedo reticularis,  Raynaud's symptoms, sclerodactyly, skin tightening  Eyes: negative for visual disturbance and persistent redness, discharge from eyes   ENT: - No tinnitus, loss of hearing, vertigo, or recurrent ear infections.  - No history of nasal/oral ulcers. - No history of dry eyes/dry mouth  Cardiovascular: No history of pericarditis, chest pain or murmur or palpitations  Respiratory: No shortness of breath, cough or history of interstitial lung disease. No history of pleurisy. No history of tuberculosis or atypical infections. Gastrointestinal: No history of heart burn, dysphagia or esophageal dysmotility. No change in bowel habits or any inflammatory bowel disease. Genitourinary: No history renal disease, miscarriages. Hematologic/Lymphatic: No abnormal bruising or bleeding, blood clots or swollen lymph nodes. Neurological: No history of seizure or focal weakness.  No history of neuropathies, paresthesias or hyperesthesias, facial droop, diplopia  Psychiatric: No history of bipolar disease, depression  Endocrine: Denies any polyuria, polydipsia and osteoporosis  Allergic/Immunologic: No nasal congestion or hives. I have reviewed patients Past medical History, Social History and Family History as mentioned in her chart and this remains unchanged fromprevious. Past Medical History:   Diagnosis Date    Anemia     Asthma     Headache     Scarlet fever      History reviewed. No pertinent surgical history. Social History     Socioeconomic History    Marital status: Single     Spouse name: Not on file    Number of children: Not on file    Years of education: Not on file    Highest education level: Not on file   Occupational History    Not on file   Tobacco Use    Smoking status: Former Smoker     Quit date:      Years since quittin.5    Smokeless tobacco: Never Used   Vaping Use    Vaping Use: Never used   Substance and Sexual Activity    Alcohol use: Yes     Comment: Rarely    Drug use: Never    Sexual activity: Not on file   Other Topics Concern    Not on file   Social History Narrative    Not on file     Social Determinants of Health     Financial Resource Strain:     Difficulty of Paying Living Expenses:    Food Insecurity:     Worried About Running Out of Food in the Last Year:     [address] in the Last Year:    Transportation Needs:     Lack of Transportation (Medical):      Lack of Transportation (Non-Medical):    Physical Activity:     Days of Exercise per Week:     Minutes of Exercise per Session:    Stress:     Feeling of Stress :    Social Connections:     Frequency of Communication with Friends and Family:     Frequency of Social Gatherings with Friends and Family:     Attends [employer] Services:     Active Member of Clubs or Organizations:     Attends Club or Organization Meetings:     Marital Status:    Intimate Partner Violence:     Fear of Current or Ex-Partner:     Emotionally Abused:     Physically Abused:     Sexually Abused:      History reviewed. No pertinent family history. PHYSICAL EXAMINATION:  [ INSTRUCTIONS:  \"[x]\" Indicates a positive item  \"[]\" Indicates a negative item  -- DELETE ALL ITEMS NOT EXAMINED]  Vital Signs: (As obtained by patient/caregiver or practitioner observation)    Blood pressure-  Heart rate-    Respiratory rate-    Temperature-  Pulse oximetry-     Constitutional: [x] Appears well-developed and well-nourished [x] No apparent distress      [] Abnormal-   Mental status  [x] Alert and awake  [x] Oriented to person/place/time [x]Able to follow commands      Eyes:  EOM    [x]  Normal  [] Abnormal-  Sclera  [x]  Normal  [] Abnormal -         Discharge [x]  None visible  [] Abnormal -    HENT:   [x] Normocephalic, atraumatic.   [] Abnormal   [x] Mouth/Throat: Mucous membranes are moist.     External Ears [x] Normal  [] Abnormal-     Neck: [x] No visualized mass     Pulmonary/Chest: [x] Respiratory effort normal.  [x] No visualized signs of difficulty breathing or respiratory distress        [] Abnormal-      Musculoskeletal:   [x] Normal gait with no signs of ataxia         [x] Normal range of motion of neck        [] Abnormal-       Neurological:        [x] No Facial Asymmetry (Cranial nerve 7 motor function) (limited exam to video visit)          [x] No gaze palsy        [] Abnormal-         Skin:        [x] No significant exanthematous lesions or discoloration noted on facial skin         [] Abnormal-            Psychiatric:       [x] Normal Affect [] No Hallucinations        [] Abnormal-       Integument:  Well hydrated, erythematous rash on the chest            LABS AND IMAGING  Outside data reviewed and in HPI    Lab Results   Component Value Date    WBC 4.9 03/04/2021    RBC 4.76 03/04/2021    HGB 14.6 03/04/2021    HCT 43.4 03/04/2021     03/04/2021    MCV 91.3 03/04/2021    MCH 30.6 03/04/2021    MCHC 33.6 03/04/2021    RDW 13.2 03/04/2021 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 History of low-grade fevers, photosensitivity, fatigue, intermittent malar rash. -No other signs and symptoms of systemic lupus  -Rash improved after starting Plaquenil  -Low titer positive DARA 1:80 speckled pattern  dsDNA, SSA/SSB, anti-Smith, RNP, APL panel, C3-C4 negative   No proteinuria  -Advised to continue follow-up with dermatology,   Continue Plaquenil  300 mg daily  Sun protection with UVA/UVB SPF 55 or above  - CBC Auto Differential; Future  - Comprehensive Metabolic Panel; Future  - C-Reactive Protein; Future  - Sedimentation Rate; Future  - Anti-DNA Antibody, Double-Stranded; Future  - C3 Complement; Future  - C4 Complement; Future  - Urinalysis Reflex to Culture; Future  - Creatinine, Random Urine; Future  - Protein, urine, random; Future    2. Long-term use of Plaquenil  Up-to-date with eye exam  -Advised to call us back if she develops any signs and symptoms of systemic lupus  -  The patient indicates understanding of these issues and agrees with the plan. Return in about 6 months (around 1/15/2022). The risks and benefits of my recommendations, as well as other treatment options, benefits and side effects werediscussed with the patient. All questions were answered. ######################################################################    I thank you for giving me theopportunity to participate in [address] [place] [address]. If you have any questions or concerns please feel free to contact me. I look forward to following  Emilie along with you. Electronically signed by: Tawnya Lucio MD, MD, 7/15/2021 8:38 AM    Documentation was done using voice recognition dragon software. Every effort was made to ensure accuracy;however, inadvertent unintentional computerized transcription errors may be present. Fibroid uterus Malpositioned intrauterine device (IUD)

## 2022-09-30 ENCOUNTER — OFFICE VISIT (OUTPATIENT)
Dept: RHEUMATOLOGY | Age: 52
End: 2022-09-30
Payer: COMMERCIAL

## 2022-09-30 VITALS
TEMPERATURE: 96.4 F | SYSTOLIC BLOOD PRESSURE: 104 MMHG | DIASTOLIC BLOOD PRESSURE: 68 MMHG | WEIGHT: 154.6 LBS | BODY MASS INDEX: 25.73 KG/M2

## 2022-09-30 DIAGNOSIS — Z79.899 LONG-TERM USE OF PLAQUENIL: ICD-10-CM

## 2022-09-30 DIAGNOSIS — L93.2 CUTANEOUS LUPUS ERYTHEMATOSUS: Primary | ICD-10-CM

## 2022-09-30 DIAGNOSIS — M15.9 PRIMARY OSTEOARTHRITIS INVOLVING MULTIPLE JOINTS: ICD-10-CM

## 2022-09-30 PROCEDURE — 3017F COLORECTAL CA SCREEN DOC REV: CPT | Performed by: INTERNAL MEDICINE

## 2022-09-30 PROCEDURE — G8427 DOCREV CUR MEDS BY ELIG CLIN: HCPCS | Performed by: INTERNAL MEDICINE

## 2022-09-30 PROCEDURE — 1036F TOBACCO NON-USER: CPT | Performed by: INTERNAL MEDICINE

## 2022-09-30 PROCEDURE — 99214 OFFICE O/P EST MOD 30 MIN: CPT | Performed by: INTERNAL MEDICINE

## 2022-09-30 PROCEDURE — G8419 CALC BMI OUT NRM PARAM NOF/U: HCPCS | Performed by: INTERNAL MEDICINE

## 2022-09-30 RX ORDER — HYDROXYCHLOROQUINE SULFATE 200 MG/1
TABLET, FILM COATED ORAL
Qty: 135 TABLET | Refills: 1 | Status: SHIPPED | OUTPATIENT
Start: 2022-09-30

## 2022-09-30 NOTE — PROGRESS NOTES
2022   Patient Name: Aurelio Pichardo  : 1970  Medical Record: [id number]    MEDICATIONS  Current Outpatient Medications   Medication Sig Dispense Refill    pantoprazole (PROTONIX) 40 MG tablet Protonix 40 mg tablet,delayed release   Take 1 tablet every day by oral route. hydroxychloroquine (PLAQUENIL) 200 MG tablet Alternate 1 tab daily with 1 tab twice a day 135 tablet 1    magnesium 30 MG tablet Take 30 mg by mouth daily      b complex vitamins capsule Take 1 capsule by mouth daily      Omega-3 Fatty Acids (OMEGA 3 PO) Take by mouth daily      PARoxetine (PAXIL) 10 MG tablet Take 10 mg by mouth every morning      finasteride (PROSCAR) 5 MG tablet Take 5 mg by mouth daily       montelukast (SINGULAIR) 10 MG tablet Take 10 mg by mouth nightly       norethindrone-ethinyl estradiol (JINTELI) 1-5 MG-MCG TABS per tablet 1 tablet daily       SUMAtriptan (IMITREX) 100 MG tablet Take 100 mg by mouth as needed       clobetasol (TEMOVATE) 0.05 % external solution Apply topically as needed       Ferrous Sulfate (IRON) 325 (65 Fe) MG TABS Take by mouth daily Not since 2022      cetirizine (ZYRTEC) 10 MG tablet Take 10 mg by mouth daily       No current facility-administered medications for this visit. ALLERGIES  Allergies   Allergen Reactions    Peanut (Diagnostic) Headaches    Pineapple      Other reaction(s): Headache         Comments  No specialty comments available. Background history:  Aurelio Pichardo is a 50 y.o. 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 She presented with a rash on the scalp with scarring alopecia about 6 months ago. 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 She underwent skin biopsy which showed interface dermatitis with basal vacuolization and superficial perivascular lymphocytic infiltrate and perifollicular plugging consistent with cutaneous lupus/discoid lupus.   She also has erythematous rash on her chest and neck. She was referred to rule out systemic lupus. She gets intermittent low-grade fevers for past several years. She also has migraine headaches, malar rash, photosensitivity, fatigue. She denies joint pain or swelling or stiffness. She denies oral or nasal ulcers, dry eyes and dry mouth, history of pleurisy or pericarditis, blood clots, miscarriages, pregnancy complications, sclerodactyly, and raynaud's, dysphagia, heartburn. She denies any muscle weakness. She denies any family history of lupus or systemic rheumatic disease. Workup by dermatology showed DARA direct negative. She was given intralesional Kenalog and topical Kenalog. Interim history: She presents for follow-up cutaneous lupus. She follows a dermatologist and is using topical steroids. She is on Plaquenil 300 mg daily. Her symptoms have improved since starting Plaquenil. She occasionally gets flareups on the scalp for which he uses topical steroid. She denies malar rash, photosensitivity, oral/nasal ulcers, dry eyes/dry mouth, joint pain, Raynaud's, blood clots or kidney diseases. She has no other signs and symptoms of lupus or systemic rheumatic disease. HPI  Her blood work showed low positive DARA 1:80 speckled pattern. Complements, sub-serologies, APL panel negative. She has no proteinuria. She denies any side effects with Plaquenil. She is overdue for eye exam.    HPI  Review of Systems    REVIEW OF SYSTEMS:   Constitutional: No unanticipated weight loss  Integumentary: No  livedo reticularis,  Raynaud's symptoms, sclerodactyly, skin tightening  Eyes: negative for visual disturbance and persistent redness, discharge from eyes   ENT: - No tinnitus, loss of hearing, vertigo, or recurrent ear infections.  - No history of nasal/oral ulcers.   - No history of dry eyes/dry mouth  Cardiovascular: No history of pericarditis, chest pain or murmur or palpitations  Respiratory: No shortness of breath, cough or history of interstitial lung disease. No history of pleurisy. No history of tuberculosis or atypical infections. Gastrointestinal: No history of heart burn, dysphagia or esophageal dysmotility. No change in bowel habits or any inflammatory bowel disease. Genitourinary: No history renal disease, miscarriages. Hematologic/Lymphatic: No abnormal bruising or bleeding, blood clots or swollen lymph nodes. Neurological: No history of seizure or focal weakness. No history of neuropathies, paresthesias or hyperesthesias, facial droop, diplopia  Psychiatric: No history of bipolar disease, depression  Endocrine: Denies any polyuria, polydipsia and osteoporosis  Allergic/Immunologic: No nasal congestion or hives. I have reviewed patients Past medical History, Social History and Family History as mentioned in her chart and this remains unchanged fromprevious.     Past Medical History:   Diagnosis Date    Anemia     Anxiety 2019    Cervical radiculopathy 2019    GERD (gastroesophageal reflux disease) 2019    Headache 2019    migraines    Lupus erythematosus 2019    cutaneous    Scarlet fever      Past Surgical History:   Procedure Laterality Date    COLONOSCOPY N/A 2022    COLORECTAL CANCER SCREENING, NOT HIGH RISK performed by Devon Lindo MD at [facility] 145 N/A 2022    EGD ESOPHAGOGASTRODUODENOSCOPY performed by Devon Lindo MD at [address] [place] [address] 110 History     Socioeconomic History    Marital status: Single     Spouse name: Not on file    Number of children: Not on file    Years of education: Not on file    Highest education level: Not on file   Occupational History    Not on file   Tobacco Use    Smoking status: Former     Packs/day: 0.50     Years: 20.00     Pack years: 10.00     Types: Cigarettes     Quit date:      Years since quittin.7    Smokeless tobacco: Never   Vaping Use    Vaping Use: Never used   Substance and Sexual Activity :  No costovertebral angle tenderness   Integument:  Well hydrated, erythematous rash on the chest, neck-erythematous papules on the scalp+ [improved]  Lymphatic:  No lymphadenopathy noted   Neurologic:   Alert & oriented x 3, CN 2-12 normal, no focal deficits noted. Sensations Intact. Muscle strength 5/5 proximallyand distally in upper and lower extremities.    Psychiatric:  Speech and behavior appropriate           LABS AND IMAGING  Outside data reviewed and in HPI    Lab Results   Component Value Date/Time    WBC 4.0 07/15/2021 12:16 PM    RBC 4.55 07/15/2021 12:16 PM    HGB 14.1 07/15/2021 12:16 PM    HCT 41.0 07/15/2021 12:16 PM     07/15/2021 12:16 PM    MCV 90.1 07/15/2021 12:16 PM    MCH 30.9 07/15/2021 12:16 PM    MCHC 34.3 07/15/2021 12:16 PM    RDW 12.8 07/15/2021 12:16 PM    LYMPHOPCT 36.5 07/15/2021 12:16 PM    MONOPCT 7.6 07/15/2021 12:16 PM    BASOPCT 0.9 07/15/2021 12:16 PM    MONOSABS 0.3 07/15/2021 12:16 PM    LYMPHSABS 1.5 07/15/2021 12:16 PM    EOSABS 0.0 07/15/2021 12:16 PM    BASOSABS 0.0 07/15/2021 12:16 PM       Chemistry        Component Value Date/Time     03/31/2022 0846    K 4.6 03/31/2022 0846     03/31/2022 0846    CO2 23 03/31/2022 0846    BUN 13 03/31/2022 0846    CREATININE 0.9 03/31/2022 0846        Component Value Date/Time    CALCIUM 9.3 03/31/2022 0846    ALKPHOS 48 03/31/2022 0846    AST 30 03/31/2022 0846    ALT 30 03/31/2022 0846    BILITOT 0.8 03/31/2022 0846          Lab Results   Component Value Date    SEDRATE 1 07/15/2021     Lab Results   Component Value Date    CRP <3.0 03/31/2022     Lab Results   Component Value Date/Time    C3 108.0 03/31/2022 08:46 AM    C3 104.3 07/15/2021 12:16 PM    C3 119.8 03/04/2021 10:15 AM    C4 18.3 03/31/2022 08:46 AM    C4 18.1 07/15/2021 12:16 PM    C4 18.4 03/04/2021 10:15 AM     No results found for: RF, CCPABIGG  No results found for: DARA, ANATITER, ANAINT, PATH  No results found for: DSDNAG, DSDNAIGGIFA  No results found for: [place]  No results found for: SMAB, RNPAB  No results found for: CENTABIGG  Lab Results   Component Value Date/Time    C3 108.0 03/31/2022 08:46 AM    C4 18.3 03/31/2022 08:46 AM     No results found for: Ethyl Citizen, [id number]  No results found for: [place]  No results found for: [id number]  No results found for: TSHFT4, TSH  No results found for: VITD25    Skin Biopsy 2/11/19  Skin biopsy with interface changes with moderate basal vacuolization and a moderate superficial perivascular lymphocytic infiltrate with numerous lymphocytes found at the dermal epidermal junction. There is a moderate perifollicular infiltrate found at the level of infundibulum/isthmus, there is a mild superficial dermal fibrosis  ASSESSMENT AND PLAN      Assessment/Plan:      ASSESSMENT:    1. Cutaneous lupus erythematosus        PLAN:     1. 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 History of low-grade fevers, photosensitivity, fatigue, intermittent malar rash. -No other signs and symptoms of systemic lupus  -Rash improved after starting Plaquenil  Topical steroids as needed  -Low titer positive DARA 1:80 speckled pattern  dsDNA, SSA/SSB, anti-Smith, RNP, APL panel, C3-C4 negative   No proteinuria  -Advised to continue follow-up with dermatology,   Continue Plaquenil 200 mg daily and alternate with 200 mg twice a day  Sun protection with UVA/UVB SPF 55 or above    -Overdue for eye exam  2. Long-term use of Plaquenil  Overdue for eye exam  -Advised to call us back if she develops any signs and symptoms of systemic lupus       Diagnosis Orders   1. Cutaneous lupus erythematosus          The patient indicates understanding of these issues and agrees with the plan. No follow-ups on file.       The risks and benefits of my recommendations, as well as other treatment options, benefits and side effects werediscussed with the patient. All questions were answered. ######################################################################    I thank you for giving me theopportunity to participate in [address] [place] [address]. If you have any questions or concerns please feel free to contact me. I look forward to following  Emilie along with you. Electronically signed by: Carlene Casiano MD, MD, 9/30/2022 8:56 AM    Documentation was done using voice recognition dragon software. Every effort was made to ensure accuracy;however, inadvertent unintentional computerized transcription errors may be present.

## 2022-09-30 NOTE — PROGRESS NOTES
2022   Patient Name: Nurys Hi  : 1970  Medical Record: [id number]    MEDICATIONS  Current Outpatient Medications   Medication Sig Dispense Refill    hydroxychloroquine (PLAQUENIL) 200 MG tablet Alternate 1 tab daily with 1 tab twice a day 135 tablet 1    pantoprazole (PROTONIX) 40 MG tablet Protonix 40 mg tablet,delayed release   Take 1 tablet every day by oral route.      magnesium 30 MG tablet Take 30 mg by mouth daily      b complex vitamins capsule Take 1 capsule by mouth daily      Omega-3 Fatty Acids (OMEGA 3 PO) Take by mouth daily      PARoxetine (PAXIL) 10 MG tablet Take 10 mg by mouth every morning      finasteride (PROSCAR) 5 MG tablet Take 5 mg by mouth daily       montelukast (SINGULAIR) 10 MG tablet Take 10 mg by mouth nightly       norethindrone-ethinyl estradiol (JINTELI) 1-5 MG-MCG TABS per tablet 1 tablet daily       SUMAtriptan (IMITREX) 100 MG tablet Take 100 mg by mouth as needed       clobetasol (TEMOVATE) 0.05 % external solution Apply topically as needed       Ferrous Sulfate (IRON) 325 (65 Fe) MG TABS Take by mouth daily Not since 2022      cetirizine (ZYRTEC) 10 MG tablet Take 10 mg by mouth daily       No current facility-administered medications for this visit. ALLERGIES  Allergies   Allergen Reactions    Peanut (Diagnostic) Headaches    Pineapple      Other reaction(s): Headache         Comments  No specialty comments available. Background history:  Nurys Hi is a 50 y.o. 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 She presented with a rash on the scalp with scarring alopecia about 6 months ago. 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 She underwent skin biopsy which showed interface dermatitis with basal vacuolization and superficial perivascular lymphocytic infiltrate and perifollicular plugging consistent with cutaneous lupus/discoid lupus.   She also has erythematous rash on her chest and neck. She was referred to rule out systemic lupus. She gets intermittent low-grade fevers for past several years. She also has migraine headaches, malar rash, photosensitivity, fatigue. She denies joint pain or swelling or stiffness. She denies oral or nasal ulcers, dry eyes and dry mouth, history of pleurisy or pericarditis, blood clots, miscarriages, pregnancy complications, sclerodactyly, and raynaud's, dysphagia, heartburn. She denies any muscle weakness. She denies any family history of lupus or systemic rheumatic disease. Workup by dermatology showed DARA direct negative. She was given intralesional Kenalog and topical Kenalog. Interim history: She presents for follow-up cutaneous lupus. She follows a dermatologist and is using topical steroids. She is on Plaquenil 300 mg daily. Her symptoms have improved since starting Plaquenil. She gets intermittent flareups on the scalp. She was advised to hold topical steroids and placed on Rogaine and vitamin B6 supplement. She denies malar rash, photosensitivity, oral/nasal ulcers, dry eyes/dry mouth, joint pain, Raynaud's, blood clots or kidney diseases. She has no other signs and symptoms of lupus or systemic rheumatic disease. HPI  Her blood work showed low positive DARA 1:80 speckled pattern. Complements, sub-serologies, APL panel negative. She has no proteinuria. She denies any side effects with Plaquenil. S she is up-to-date with eye exam  She reports mild achiness in both hands with stiffness. She wakes up in the morning with stiffness lasting for 10 to 15 minutes. She denies any swelling in the joints. She denies any other joint pain or swelling or stiffness. She denies psoriasis, inflammatory bowel disease, inflammatory back pain.   HPI  Review of Systems    REVIEW OF SYSTEMS:   Constitutional: No unanticipated weight loss  Integumentary: No  livedo reticularis,  Raynaud's symptoms, sclerodactyly, skin tightening  Eyes: negative for visual disturbance and persistent redness, discharge from eyes   ENT: - No tinnitus, loss of hearing, vertigo, or recurrent ear infections.  - No history of nasal/oral ulcers. - No history of dry eyes/dry mouth  Cardiovascular: No history of pericarditis, chest pain or murmur or palpitations  Respiratory: No shortness of breath, cough or history of interstitial lung disease. No history of pleurisy. No history of tuberculosis or atypical infections. Gastrointestinal: No history of heart burn, dysphagia or esophageal dysmotility. No change in bowel habits or any inflammatory bowel disease. Genitourinary: No history renal disease, miscarriages. Hematologic/Lymphatic: No abnormal bruising or bleeding, blood clots or swollen lymph nodes. Neurological: No history of seizure or focal weakness. No history of neuropathies, paresthesias or hyperesthesias, facial droop, diplopia  Psychiatric: No history of bipolar disease, depression  Endocrine: Denies any polyuria, polydipsia and osteoporosis  Allergic/Immunologic: No nasal congestion or hives. I have reviewed patients Past medical History, Social History and Family History as mentioned in her chart and this remains unchanged fromprevious.     Past Medical History:   Diagnosis Date    Anemia     Anxiety 03/06/2019    Cervical radiculopathy 2019    GERD (gastroesophageal reflux disease) 2019    Headache 2019    migraines    Lupus erythematosus 08/01/2019    cutaneous    Scarlet fever      Past Surgical History:   Procedure Laterality Date    COLONOSCOPY N/A 2/1/2022    COLORECTAL CANCER SCREENING, NOT HIGH RISK performed by Joanna Hartman MD at [address] [place] [address] N/A 2/1/2022    EGD ESOPHAGOGASTRODUODENOSCOPY performed by Joanna Hartman MD at [address] [place] [address] History     Socioeconomic History    Marital status: Single     Spouse name: Not on file    Number of children: Not on file Knee  0  0  FULL   0  0  FULL    Ankle  0  0  FULL   0  0  FULL    MTP1  0  0  FULL   0  0  FULL    MTP2  0  0  FULL   0  0  FULL    MTP3  0  0  FULL   0  0  FULL    MTP4  0  0  FULL   0  0  FULL    MTP5  0  0  FULL   0  0  FULL    IP1  0  0  FULL   0  0  FULL    IP2  0  0  FULL   0  0  FULL    IP3  0  0  FULL   0  0  FULL    IP4  0  0  FULL   0  0  FULL    IP5  0  0  FULL   0 0 FULL      Ambulates without assistance, normal gait  Neck: Full ROM, no tenderness,supple   Back- no tenderness. Eyes:  PERRL, extra ocular movements intact, conjunctiva normal   HEENT:  Atraumatic, normocephalic, external ears normal, oropharynx moist, no pharyngeal exudates. Respiratory:  No respiratory distress  GI:  Soft, nondistended, normal bowel sounds, nontender, noorganomegaly, no mass, no rebound, no guarding   :  No costovertebral angle tenderness   Integument:  Well hydrated, erythematous rash on the chest, neck-erythematous papules on the scalp+ [improved]  Lymphatic:  No lymphadenopathy noted   Neurologic:   Alert & oriented x 3, CN 2-12 normal, no focal deficits noted. Sensations Intact. Muscle strength 5/5 proximallyand distally in upper and lower extremities.    Psychiatric:  Speech and behavior appropriate           LABS AND IMAGING  Outside data reviewed and in HPI    Lab Results   Component Value Date/Time    WBC 4.0 07/15/2021 12:16 PM    RBC 4.55 07/15/2021 12:16 PM    HGB 14.1 07/15/2021 12:16 PM    HCT 41.0 07/15/2021 12:16 PM     07/15/2021 12:16 PM    MCV 90.1 07/15/2021 12:16 PM    MCH 30.9 07/15/2021 12:16 PM    MCHC 34.3 07/15/2021 12:16 PM    RDW 12.8 07/15/2021 12:16 PM    LYMPHOPCT 36.5 07/15/2021 12:16 PM    MONOPCT 7.6 07/15/2021 12:16 PM    BASOPCT 0.9 07/15/2021 12:16 PM    MONOSABS 0.3 07/15/2021 12:16 PM    LYMPHSABS 1.5 07/15/2021 12:16 PM    EOSABS 0.0 07/15/2021 12:16 PM    BASOSABS 0.0 07/15/2021 12:16 PM       Chemistry        Component Value Date/Time     03/31/2022 0846    K 4.6 03/31/2022 0846     03/31/2022 0846    CO2 23 03/31/2022 0846    BUN 13 03/31/2022 0846    CREATININE 0.9 03/31/2022 0846        Component Value Date/Time    CALCIUM 9.3 03/31/2022 0846    ALKPHOS 48 03/31/2022 0846    AST 30 03/31/2022 0846    ALT 30 03/31/2022 0846    BILITOT 0.8 03/31/2022 0846          Lab Results   Component Value Date    SEDRATE 1 07/15/2021     Lab Results   Component Value Date    CRP <3.0 03/31/2022     Lab Results   Component Value Date/Time    C3 108.0 03/31/2022 08:46 AM    C3 104.3 07/15/2021 12:16 PM    C3 119.8 03/04/2021 10:15 AM    C4 18.3 03/31/2022 08:46 AM    C4 18.1 07/15/2021 12:16 PM    C4 18.4 03/04/2021 10:15 AM     No results found for: RF, CCPABIGG  No results found for: DARA, ANATITER, ANAINT, PATH  No results found for: DSDNAG, DSDNAIGGIFA  No results found for: SSAROAB, SSALAAB  No results found for: SMAB, RNPAB  No results found for: CENTABIGG  Lab Results   Component Value Date/Time    C3 108.0 03/31/2022 08:46 AM    C4 18.3 03/31/2022 08:46 AM     No results found for: David Bhatti, [id number]  No results found for: Vonkatherine Nuria  No results found for: [id number]  No results found for: TSHFT4, TSH  No results found for: VITD25    Skin Biopsy 2/11/19  Skin biopsy with interface changes with moderate basal vacuolization and a moderate superficial perivascular lymphocytic infiltrate with numerous lymphocytes found at the dermal epidermal junction. There is a moderate perifollicular infiltrate found at the level of infundibulum/isthmus, there is a mild superficial dermal fibrosis  ASSESSMENT AND PLAN      Assessment/Plan:      ASSESSMENT:    1. Cutaneous lupus erythematosus    2. Long-term use of Plaquenil    3. Primary osteoarthritis involving multiple joints        PLAN:     1. 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   History of low-grade fevers, photosensitivity, fatigue, intermittent malar rash. -No other signs and symptoms of systemic lupus  -Rash improved after starting Plaquenil, continues to get occasional flareups-follows dermatology. Advised to hold topical steroids and placed on vitamin B 3 supplements.  -Low titer positive DARA 1:80 speckled pattern  dsDNA, SSA/SSB, anti-Smith, RNP, APL panel, C3-C4 negative   No proteinuria  -Advised to continue follow-up with dermatology,   Continue Plaquenil 200 mg daily and alternate with 200 mg twice a day  Sun protection with UVA/UVB SPF 55 or above  Up-to-date with eye exam  2. Long-term use of Plaquenil  Up-to-date with eye exam  -Advised to call us back if she develops any signs and symptoms of systemic lupus    3. Primary osteoarthritis involving multiple joints-joint symptoms most likely due to osteoarthritis. I will obtain bilateral hand x-rays. Advised to take over-the-counter Aleve or Advil as needed  Paraffin wax bath  Hand exercises were provided     Diagnosis Orders   1. Cutaneous lupus erythematosus  hydroxychloroquine (PLAQUENIL) 200 MG tablet    CBC with Auto Differential    Comprehensive Metabolic Panel    Anti-DNA Antibody, Double-Stranded    C3 Complement    C4 Complement      2. Long-term use of Plaquenil        3. Primary osteoarthritis involving multiple joints  XR HAND RIGHT (MIN 3 VIEWS)    XR HAND LEFT (MIN 3 VIEWS)    Cyclic Citrul Peptide Antibody, IgG    Hepatitis B Core Antibody, IgM    Hepatitis B Surface Antigen    Hepatitis C Antibody    Rheumatoid Factor        The patient indicates understanding of these issues and agrees with the plan. Return in about 6 months (around 3/30/2023). The risks and benefits of my recommendations, as well as other treatment options, benefits and side effects werediscussed with the patient. All questions were answered.        ######################################################################    I thank you for giving me theopportunity to participate in [facility]

## 2022-10-03 DIAGNOSIS — L93.2 CUTANEOUS LUPUS ERYTHEMATOSUS: ICD-10-CM

## 2022-10-03 RX ORDER — HYDROXYCHLOROQUINE SULFATE 200 MG/1
TABLET, FILM COATED ORAL
Qty: 135 TABLET | Refills: 1 | OUTPATIENT
Start: 2022-10-03

## 2023-04-02 DIAGNOSIS — L93.2 CUTANEOUS LUPUS ERYTHEMATOSUS: ICD-10-CM

## 2023-04-03 RX ORDER — HYDROXYCHLOROQUINE SULFATE 200 MG/1
TABLET, FILM COATED ORAL
Qty: 135 TABLET | Refills: 1 | OUTPATIENT
Start: 2023-04-03